# Patient Record
Sex: MALE | Race: WHITE | NOT HISPANIC OR LATINO | Employment: OTHER | URBAN - METROPOLITAN AREA
[De-identification: names, ages, dates, MRNs, and addresses within clinical notes are randomized per-mention and may not be internally consistent; named-entity substitution may affect disease eponyms.]

---

## 2017-01-03 RX ORDER — LOSARTAN POTASSIUM 25 MG/1
TABLET ORAL
Qty: 90 TABLET | OUTPATIENT
Start: 2017-01-03

## 2017-02-20 RX ORDER — TAMSULOSIN HYDROCHLORIDE 0.4 MG/1
CAPSULE ORAL
Qty: 90 CAPSULE | Refills: 3 | Status: SHIPPED | OUTPATIENT
Start: 2017-02-20 | End: 2018-02-15 | Stop reason: SDUPTHER

## 2017-03-31 DIAGNOSIS — I10 ESSENTIAL HYPERTENSION: ICD-10-CM

## 2017-03-31 DIAGNOSIS — N18.30 CHRONIC KIDNEY DISEASE (CKD), STAGE III (MODERATE) (HCC): ICD-10-CM

## 2017-03-31 DIAGNOSIS — Z00.00 HEALTH CARE MAINTENANCE: Primary | ICD-10-CM

## 2017-03-31 DIAGNOSIS — R73.03 BORDERLINE DIABETES: ICD-10-CM

## 2017-03-31 DIAGNOSIS — E78.2 MIXED HYPERLIPIDEMIA: ICD-10-CM

## 2017-03-31 DIAGNOSIS — E03.9 ACQUIRED HYPOTHYROIDISM: ICD-10-CM

## 2017-03-31 DIAGNOSIS — R73.09 ELEVATED HEMOGLOBIN A1C: ICD-10-CM

## 2017-04-06 LAB
ALBUMIN SERPL-MCNC: 4.3 G/DL (ref 3.5–5.2)
ALBUMIN/GLOB SERPL: 1.7 G/DL
ALP SERPL-CCNC: 57 U/L (ref 39–117)
ALT SERPL-CCNC: 32 U/L (ref 1–41)
APPEARANCE UR: CLEAR
AST SERPL-CCNC: 32 U/L (ref 1–40)
BACTERIA #/AREA URNS HPF: ABNORMAL /HPF
BASOPHILS # BLD AUTO: 0.02 10*3/MM3 (ref 0–0.2)
BASOPHILS NFR BLD AUTO: 0.2 % (ref 0–1.5)
BILIRUB SERPL-MCNC: 0.9 MG/DL (ref 0.1–1.2)
BILIRUB UR QL STRIP: NEGATIVE
BUN SERPL-MCNC: 14 MG/DL (ref 8–23)
BUN/CREAT SERPL: 9.5 (ref 7–25)
CALCIUM SERPL-MCNC: 10.5 MG/DL (ref 8.6–10.5)
CASTS URNS MICRO: ABNORMAL
CASTS URNS QL MICRO: PRESENT /LPF
CHLORIDE SERPL-SCNC: 104 MMOL/L (ref 98–107)
CHOLEST SERPL-MCNC: 128 MG/DL (ref 0–200)
CO2 SERPL-SCNC: 28.7 MMOL/L (ref 22–29)
COLOR UR: YELLOW
CREAT SERPL-MCNC: 1.47 MG/DL (ref 0.76–1.27)
CRYSTALS URNS MICRO: ABNORMAL
EOSINOPHIL # BLD AUTO: 0.59 10*3/MM3 (ref 0–0.7)
EOSINOPHIL NFR BLD AUTO: 6.7 % (ref 0.3–6.2)
EPI CELLS #/AREA URNS HPF: ABNORMAL /HPF
ERYTHROCYTE [DISTWIDTH] IN BLOOD BY AUTOMATED COUNT: 13.9 % (ref 11.5–14.5)
GLOBULIN SER CALC-MCNC: 2.5 GM/DL
GLUCOSE SERPL-MCNC: 121 MG/DL (ref 65–99)
GLUCOSE UR QL: NEGATIVE
HBA1C MFR BLD: 5.47 % (ref 4.8–5.6)
HCT VFR BLD AUTO: 45.5 % (ref 40.4–52.2)
HDLC SERPL-MCNC: 53 MG/DL (ref 40–60)
HGB BLD-MCNC: 15.3 G/DL (ref 13.7–17.6)
HGB UR QL STRIP: NEGATIVE
IMM GRANULOCYTES # BLD: 0.03 10*3/MM3 (ref 0–0.03)
IMM GRANULOCYTES NFR BLD: 0.3 % (ref 0–0.5)
KETONES UR QL STRIP: NEGATIVE
LDLC SERPL CALC-MCNC: 51 MG/DL (ref 0–100)
LEUKOCYTE ESTERASE UR QL STRIP: NEGATIVE
LYMPHOCYTES # BLD AUTO: 2.49 10*3/MM3 (ref 0.9–4.8)
LYMPHOCYTES NFR BLD AUTO: 28.2 % (ref 19.6–45.3)
MCH RBC QN AUTO: 31.8 PG (ref 27–32.7)
MCHC RBC AUTO-ENTMCNC: 33.6 G/DL (ref 32.6–36.4)
MCV RBC AUTO: 94.6 FL (ref 79.8–96.2)
MICRO URNS: NORMAL
MICRO URNS: NORMAL
MONOCYTES # BLD AUTO: 0.64 10*3/MM3 (ref 0.2–1.2)
MONOCYTES NFR BLD AUTO: 7.2 % (ref 5–12)
MUCOUS THREADS URNS QL MICRO: PRESENT /HPF
NEUTROPHILS # BLD AUTO: 5.06 10*3/MM3 (ref 1.9–8.1)
NEUTROPHILS NFR BLD AUTO: 57.4 % (ref 42.7–76)
NITRITE UR QL STRIP: NEGATIVE
PH UR STRIP: 5.5 [PH] (ref 5–7.5)
PLATELET # BLD AUTO: 143 10*3/MM3 (ref 140–500)
POTASSIUM SERPL-SCNC: 4.6 MMOL/L (ref 3.5–5.2)
PROT SERPL-MCNC: 6.8 G/DL (ref 6–8.5)
PROT UR QL STRIP: NORMAL
RBC # BLD AUTO: 4.81 10*6/MM3 (ref 4.6–6)
RBC #/AREA URNS HPF: ABNORMAL /HPF
SODIUM SERPL-SCNC: 145 MMOL/L (ref 136–145)
SP GR UR: 1.02 (ref 1–1.03)
T4 FREE SERPL-MCNC: 0.93 NG/DL (ref 0.93–1.7)
TRIGL SERPL-MCNC: 121 MG/DL (ref 0–150)
TSH SERPL DL<=0.005 MIU/L-ACNC: 4.54 MIU/ML (ref 0.27–4.2)
UNIDENT CRYS URNS QL MICRO: PRESENT /LPF
URINALYSIS REFLEX: NORMAL
UROBILINOGEN UR STRIP-MCNC: 0.2 MG/DL (ref 0.2–1)
VLDLC SERPL CALC-MCNC: 24.2 MG/DL (ref 5–40)
WBC # BLD AUTO: 8.83 10*3/MM3 (ref 4.5–10.7)
WBC #/AREA URNS HPF: ABNORMAL /HPF

## 2017-04-12 ENCOUNTER — OFFICE VISIT (OUTPATIENT)
Dept: INTERNAL MEDICINE | Facility: CLINIC | Age: 82
End: 2017-04-12

## 2017-04-12 VITALS
OXYGEN SATURATION: 98 % | HEART RATE: 63 BPM | HEIGHT: 69 IN | SYSTOLIC BLOOD PRESSURE: 122 MMHG | DIASTOLIC BLOOD PRESSURE: 72 MMHG | BODY MASS INDEX: 23.85 KG/M2 | RESPIRATION RATE: 18 BRPM | WEIGHT: 161 LBS

## 2017-04-12 DIAGNOSIS — N18.30 CHRONIC KIDNEY DISEASE (CKD), STAGE III (MODERATE) (HCC): ICD-10-CM

## 2017-04-12 DIAGNOSIS — E03.9 ACQUIRED HYPOTHYROIDISM: ICD-10-CM

## 2017-04-12 DIAGNOSIS — E78.2 MIXED HYPERLIPIDEMIA: ICD-10-CM

## 2017-04-12 DIAGNOSIS — Z00.00 HEALTH MAINTENANCE EXAMINATION: Primary | ICD-10-CM

## 2017-04-12 DIAGNOSIS — Z00.00 MEDICARE ANNUAL WELLNESS VISIT, SUBSEQUENT: ICD-10-CM

## 2017-04-12 PROCEDURE — G0439 PPPS, SUBSEQ VISIT: HCPCS | Performed by: INTERNAL MEDICINE

## 2017-04-12 PROCEDURE — 99397 PER PM REEVAL EST PAT 65+ YR: CPT | Performed by: INTERNAL MEDICINE

## 2017-04-12 RX ORDER — DOXYCYCLINE 50 MG/1
TABLET ORAL
Refills: 4 | COMMUNITY
Start: 2017-03-30 | End: 2019-09-18

## 2017-04-12 RX ORDER — ARIPIPRAZOLE 2 MG/1
4 TABLET ORAL DAILY
COMMUNITY

## 2017-04-12 NOTE — PROGRESS NOTES
Medicare Annual Wellness Visit    Chief Complaint:  Annual Exam (SUB AWV & CPE); Hypothyroidism; and Hyperlipidemia      History of Present Illness    Tyrone Kraft is a 83 y.o. male who presents for an Annual Wellness Visit & CPE. In addition, we addressed the following health issues:    C-scope: 2007.  He declines further scopes.    Flu shot:2016  Pneumovax: his wife says he has had this but is unsure of date.    Dental Exam:  Last month  Eye Exam: he has been to the eye doctor several times this year so far.    Exercise:  none  Advanced Care Planning:  has an advance directive - a copy HAS NOT been provided   Immunization History   Administered Date(s) Administered   • Pneumococcal Conjugate 13-Valent 10/12/2016   • Zoster 06/26/2012     He is here to follow up on his thyroid and HLD and CKD as well.  He does occasionally miss a dose of meds, so it's possible that his TSH is off as a result of this.  He is on a new med from his psychiatrist - abilify - which seems to have actually really helped.  He is less impulsive and is more alert.  No new concerns.  He denies any other symptoms that his thyroid could be off.  He is not as fatigued and he is not having any issues with his bowels.  His last c-scope was likely 2008 per his wife.  He does not wish to do this again.      Review of Systems   Constitutional: Negative for chills, fatigue and fever.   HENT: Negative for hearing loss, sore throat, tinnitus, trouble swallowing and voice change.    Eyes: Negative for visual disturbance.   Respiratory: Negative for cough and shortness of breath.    Cardiovascular: Negative for chest pain, palpitations and leg swelling.   Gastrointestinal: Negative for abdominal distention, abdominal pain, anal bleeding, blood in stool, constipation, diarrhea, nausea and vomiting.   Endocrine: Positive for polydipsia (due to dry mouth). Negative for polyuria.   Genitourinary: Negative for decreased urine volume, dysuria and hematuria.    Musculoskeletal: Positive for arthralgias (occ in his hip and knee). Negative for myalgias.   Skin: Negative for rash.   Neurological: Negative for dizziness, syncope, light-headedness and headaches.   Hematological: Negative for adenopathy. Does not bruise/bleed easily.   Psychiatric/Behavioral: Negative for confusion and dysphoric mood. The patient is not nervous/anxious.        Past Medical History:   Diagnosis Date   • BPPV (benign paroxysmal positional vertigo) 02/03/2015    I have put in a script for meclizine to take at night.  If he is not better in a week, will need to send to ENT for Eply maneuvers.   • Chronic kidney disease     Chronic. CR previously 1.6-2 but it was 1.4 in 11/13, f/b Dr. Goff   • Chronic kidney disease 02/03/2015    Stable. he sees Dr. Goff.  I will send his labs to [facility].   • Chronic kidney disease 08/13/2015    Stable.  Cont to monitor q4-6 mo.   • Coronary arteriosclerosis     MI in 1986 treated medically, had cath and then CABG in 1990 in [place], had stents in 2000 in [place].  Myoview 10/2014  MI but no ischemia.   • Dementia without behavioral disturbance 08/13/2015    Managed by Psychiatrist. I have encouraged his wife to be very vigilant with his driving and his reation time.  According to them, his psychiatrist is OK with him driving.   • Enlarged prostate without lower urinary tract symptoms (luts)    • Gastroparesis    • GERD (gastroesophageal reflux disease)    • Hypercalcemia 02/03/2015    Recheck labs in 3 months.   • Hyperlipidemia 08/13/2015    Very well controlled.  cont current meds.   • Hypertension 08/13/2015    Controlled. Cont current meds.   • Ischemic cardiomyopathy     Reported history, s/p singl lead Medtronic ICD 2006 in [place], no details available; repeat echo 2/2013 showed normal LV size and function, EF 55%   • MGUS (monoclonal gammopathy of unknown significance)    • Nonsustained ventricular tachycardia     Noted incidentally on ECD  interrogation   • KELLY (obstructive sleep apnea)        Past Surgical History:   Procedure Laterality Date   • ANGIOPLASTY      Cath Stent Placement   • CARDIAC DEFIBRILLATOR PLACEMENT      Medtronic singl lead, 2006, [facility]   • CORONARY ARTERY BYPASS GRAFT     • TOE SURGERY         Social History     Social History   • Marital status:      Spouse name: N/A   • Number of children: N/A   • Years of education: N/A     Occupational History   • Not on file.     Social History Main Topics   • Smoking status: Former Smoker   • Smokeless tobacco: Not on file      Comment: minimal years ago   • Alcohol use No   • Drug use: No   • Sexual activity: Not on file     Other Topics Concern   • Not on file     Social History Narrative       Family History   Problem Relation Age of Onset   • Thyroid disease Mother    • Lung disease Father    • Depression Brother    • Cancer Brother      LIver   • Myelodysplastic syndrome Brother    • Stroke Brother        No Known Allergies    Current Outpatient Prescriptions on File Prior to Visit   Medication Sig Dispense Refill   • acebutolol (SECTRAL) 200 MG capsule TAKE 1 CAPSULE DAILY 90 capsule 3   • aspirin 81 MG tablet Take by mouth.     • B Complex Vitamins (VITAMIN B COMPLEX) tablet Take 1 tablet by mouth daily.     • benzonatate (TESSALON PERLES) 100 MG capsule Take 1 capsule by mouth Every Night.  0   • esomeprazole (NexIUM) 40 MG capsule Take 1 capsule by mouth daily.     • Glucosamine 500 MG capsule Take by mouth.     • levothyroxine (LEVOTHROID) 25 MCG tablet Take 1 tablet by mouth Daily. 90 tablet 3   • Memantine HCl ER 21 MG capsule sustained-release 24 hr Take by mouth.     • Omega-3 Fatty Acids (FISH OIL) 1000 MG capsule capsule Take by mouth.     • polycarbophil (FIBERCON) 625 MG tablet Take by mouth.     • rivastigmine (EXELON) 6 MG capsule Take 1 capsule by mouth 2 (two) times a day.     • simvastatin (ZOCOR) 40 MG tablet TAKE 1 TABLET DAILY 90 tablet 3   •  "tamsulosin (FLOMAX) 0.4 MG capsule 24 hr capsule TAKE 1 CAPSULE DAILY 90 capsule 3   • traZODone (DESYREL) 50 MG tablet Take 1 tablet by mouth nightly.     • venlafaxine XR (EFFEXOR-XR) 37.5 MG 24 hr capsule Take by mouth.     • [DISCONTINUED] doxycycline (VIBRAMYCIN) 50 MG capsule Take 1 capsule by mouth daily.       No current facility-administered medications on file prior to visit.        Patient Active Problem List   Diagnosis   • Benign paroxysmal positional nystagmus   • Chronic kidney failure   • Chronic kidney disease (CKD), stage III (moderate)   • Enlarged prostate   • HLD (hyperlipidemia)   • MGUS (monoclonal gammopathy of unknown significance)   • Borderline diabetes   • Acquired hypothyroidism   • Elevated white blood cell count   • Perennial allergic rhinitis       Health Risk Assessment/Depression Screen/Functional and Cognitive Screening:   The patient has completed a Health Risk Assessment & Depression screen. These have been reviewed with them and have been scanned as a separate documents.    Age-appropriate Screening Schedule:  Refer to the list below for future screening recommendations based on patient's age. Orders for these recommended tests are listed in the plan section. The patient has been provided with a written plan.     I have reviewed their problem list, past medical history, family history, social history, and surgical history.     Vitals:    04/12/17 1253   BP: 122/72   Pulse: 63   Resp: 18   SpO2: 98%   Weight: 161 lb (73 kg)   Height: 69\" (175.3 cm)       Body mass index is 23.78 kg/(m^2).    Physical Exam   Constitutional: He is oriented to person, place, and time. He appears well-developed and well-nourished. No distress.   HENT:   Head: Normocephalic and atraumatic.   Right Ear: Hearing and external ear normal.   Left Ear: Hearing and external ear normal.   Nose: Nose normal.   Mouth/Throat: Oropharynx is clear and moist and mucous membranes are normal.   Eyes: Conjunctivae, " EOM and lids are normal. Pupils are equal, round, and reactive to light. No scleral icterus.   Neck: Trachea normal and normal range of motion. Neck supple.   Cardiovascular: Normal rate, regular rhythm and normal heart sounds.  Exam reveals no gallop and no friction rub.    No murmur heard.  Pulses:       Carotid pulses are 2+ on the right side, and 2+ on the left side.       Femoral pulses are 2+ on the right side, and 2+ on the left side.       Dorsalis pedis pulses are 2+ on the right side, and 2+ on the left side.        Posterior tibial pulses are 2+ on the right side, and 2+ on the left side.   Pulmonary/Chest: Effort normal and breath sounds normal. No respiratory distress. He has no wheezes. He has no rales.   Abdominal: Soft. Normal appearance and bowel sounds are normal. He exhibits no distension and no mass. There is no tenderness.   Musculoskeletal: Normal range of motion. He exhibits no edema.       Vascular Status -  His exam exhibits no right foot edema. His exam exhibits no left foot edema.   Skin Integrity  -  His right foot skin is intact.     Tyrone 's left foot skin is intact. .  Lymphadenopathy:     He has no cervical adenopathy.        Right: No inguinal and no supraclavicular adenopathy present.        Left: No inguinal and no supraclavicular adenopathy present.   Neurological: He is alert and oriented to person, place, and time. He has normal strength. No cranial nerve deficit. He exhibits normal muscle tone. Coordination and gait normal.   Skin: Skin is warm and dry. No rash noted.   Psychiatric: He has a normal mood and affect. His speech is normal and behavior is normal. Judgment and thought content normal. Cognition and memory are normal.   Vitals reviewed.      Results for orders placed or performed in visit on 03/31/17   Comprehensive Metabolic Panel   Result Value Ref Range    Glucose 121 (H) 65 - 99 mg/dL    BUN 14 8 - 23 mg/dL    Creatinine 1.47 (H) 0.76 - 1.27 mg/dL    eGFR Non   Am 46 (L) >60 mL/min/1.73    eGFR African Am 55 (L) >60 mL/min/1.73    BUN/Creatinine Ratio 9.5 7.0 - 25.0    Sodium 145 136 - 145 mmol/L    Potassium 4.6 3.5 - 5.2 mmol/L    Chloride 104 98 - 107 mmol/L    Total CO2 28.7 22.0 - 29.0 mmol/L    Calcium 10.5 8.6 - 10.5 mg/dL    Total Protein 6.8 6.0 - 8.5 g/dL    Albumin 4.30 3.50 - 5.20 g/dL    Globulin 2.5 gm/dL    A/G Ratio 1.7 g/dL    Total Bilirubin 0.9 0.1 - 1.2 mg/dL    Alkaline Phosphatase 57 39 - 117 U/L    AST (SGOT) 32 1 - 40 U/L    ALT (SGPT) 32 1 - 41 U/L   Lipid Panel   Result Value Ref Range    Total Cholesterol 128 0 - 200 mg/dL    Triglycerides 121 0 - 150 mg/dL    HDL Cholesterol 53 40 - 60 mg/dL    VLDL Cholesterol 24.2 5 - 40 mg/dL    LDL Cholesterol  51 0 - 100 mg/dL   TSH Rfx On Abnormal To Free T4   Result Value Ref Range    TSH 4.54 (H) 0.27 - 4.2 mIU/mL   UA / M With / Rflx Culture([facility] ONLY)   Result Value Ref Range    Specific Gravity, UA 1.025 1.005 - 1.030    pH, UA 5.5 5.0 - 7.5    Color, UA Yellow Yellow    Appearance, UA Clear Clear    Leukocytes, UA Negative Negative    Protein Trace Negative/Trace    Glucose, UA Negative Negative    Ketones Negative Negative    Blood, UA Negative Negative    Bilirubin, UA Negative Negative    Urobilinogen, UA 0.2 0.2 - 1.0 mg/dL    Nitrite, UA Negative Negative    Microscopic Examination Comment     MICROSCOPIC EXAMINATION See below:     Urinalysis Reflex Comment    Hemoglobin A1c   Result Value Ref Range    Hemoglobin A1C 5.47 4.80 - 5.60 %   Microscopic Examination   Result Value Ref Range    WBC, UA 0-5 0 - 5 /hpf    RBC, UA 0-2 0 - 2 /hpf    Epithelial Cells (non renal) 0-10 0 - 10 /hpf    Casts Present (A) None seen /lpf    Cast Type Hyaline casts N/A    Crystals, UA Present (A) N/A /lpf    Crystal Type Calcium Oxalate N/A    Mucus, UA Present Not Estab. /hpf    Bacteria, UA None seen None seen/Few /hpf   T4F   Result Value Ref Range    Free T4 0.93 0.93 - 1.70 ng/dL   CBC &  Differential   Result Value Ref Range    WBC 8.83 4.50 - 10.70 10*3/mm3    RBC 4.81 4.60 - 6.00 10*6/mm3    Hemoglobin 15.3 13.7 - 17.6 g/dL    Hematocrit 45.5 40.4 - 52.2 %    MCV 94.6 79.8 - 96.2 fL    MCH 31.8 27.0 - 32.7 pg    MCHC 33.6 32.6 - 36.4 g/dL    RDW 13.9 11.5 - 14.5 %    Platelets 143 140 - 500 10*3/mm3    Neutrophil Rel % 57.4 42.7 - 76.0 %    Lymphocyte Rel % 28.2 19.6 - 45.3 %    Monocyte Rel % 7.2 5.0 - 12.0 %    Eosinophil Rel % 6.7 (H) 0.3 - 6.2 %    Basophil Rel % 0.2 0.0 - 1.5 %    Neutrophils Absolute 5.06 1.90 - 8.10 10*3/mm3    Lymphocytes Absolute 2.49 0.90 - 4.80 10*3/mm3    Monocytes Absolute 0.64 0.20 - 1.20 10*3/mm3    Eosinophils Absolute 0.59 0.00 - 0.70 10*3/mm3    Basophils Absolute 0.02 0.00 - 0.20 10*3/mm3    Immature Granulocyte Rel % 0.3 0.0 - 0.5 %    Immature Grans Absolute 0.03 0.00 - 0.03 10*3/mm3       Assessment & Plan:    Diagnoses and all orders for this visit:    Health maintenance examination    Medicare annual wellness visit, subsequent    Chronic kidney disease (CKD), stage III (moderate)    Acquired hypothyroidism    Mixed hyperlipidemia    Other orders  -     doxycycline (ADOXA) 50 MG tablet; TK 1 T PO  BID  -     ARIPiprazole (ABILIFY) 5 MG tablet; Take 5 mg by mouth Daily.        Discussion/Summary  Will is here for his CPE/AWV.  He is up to date on health maintenance.  He is generally doing well.  His labs all look good.  His renal function is stable.  His TSH is mildly out of range but we are going to repeat this in a month.  If it is still abnormal then, we will increase his dose slightly.  Advised to continue all current meds.        Follow Up:  Return in about 6 months (around 10/12/2017) for Next scheduled follow up, with nonfasting labs prior; 1 month labs only.     An After Visit Summary and PPPS with all of these plans were given to the patient.

## 2017-04-12 NOTE — PATIENT INSTRUCTIONS
Medicare Wellness  Personal Prevention Plan of Service     Date of Office Visit:  2017  Encounter Provider:  Evelin Parrish MD  Place of Service:  [facility] INTER[facility] MED[facility]  Patient Name: Tyrone Kraft  :  1934    As part of the Medicare Wellness portion of your visit today, we are providing you with this personalized preventive plan of services (PPPS). This plan is based upon recommendations of the [place] Preventive Services Task Force (USPSTF) and the Advisory Committee on Immunization Practices (ACIP).    This lists the preventive care services that should be considered, and provides dates of when you are due. Items listed as completed are up-to-date and do not require any further intervention.    Health Maintenance   Topic Date Due   • TDAP/TD VACCINES (1 - Tdap) 1953   • PNEUMOCOCCAL VACCINES (65+ LOW/MEDIUM RISK) (2 of 2 - PPSV23) 10/12/2017   • LIPID PANEL  2018   • MEDICARE ANNUAL WELLNESS  2018   • INFLUENZA VACCINE  Completed   • ZOSTER VACCINE  Completed       No orders of the defined types were placed in this encounter.      No Follow-up on file.

## 2017-05-17 DIAGNOSIS — E03.9 ACQUIRED HYPOTHYROIDISM: ICD-10-CM

## 2017-09-25 DIAGNOSIS — E03.9 ACQUIRED HYPOTHYROIDISM: ICD-10-CM

## 2017-09-25 RX ORDER — LEVOTHYROXINE SODIUM 0.03 MG/1
TABLET ORAL
Qty: 90 TABLET | Refills: 3 | Status: SHIPPED | OUTPATIENT
Start: 2017-09-25 | End: 2017-10-17 | Stop reason: SDUPTHER

## 2017-10-12 LAB — TSH SERPL DL<=0.005 MIU/L-ACNC: 4.94 MIU/ML (ref 0.27–4.2)

## 2017-10-17 ENCOUNTER — OFFICE VISIT (OUTPATIENT)
Dept: INTERNAL MEDICINE | Facility: CLINIC | Age: 82
End: 2017-10-17

## 2017-10-17 VITALS
BODY MASS INDEX: 23.31 KG/M2 | HEIGHT: 69 IN | WEIGHT: 157.4 LBS | OXYGEN SATURATION: 96 % | SYSTOLIC BLOOD PRESSURE: 144 MMHG | HEART RATE: 68 BPM | RESPIRATION RATE: 18 BRPM | DIASTOLIC BLOOD PRESSURE: 76 MMHG

## 2017-10-17 DIAGNOSIS — E78.2 MIXED HYPERLIPIDEMIA: Primary | ICD-10-CM

## 2017-10-17 DIAGNOSIS — E03.9 ACQUIRED HYPOTHYROIDISM: ICD-10-CM

## 2017-10-17 DIAGNOSIS — Z23 NEED FOR INFLUENZA VACCINATION: ICD-10-CM

## 2017-10-17 DIAGNOSIS — R73.03 BORDERLINE DIABETES: ICD-10-CM

## 2017-10-17 DIAGNOSIS — N18.30 CHRONIC KIDNEY DISEASE (CKD), STAGE III (MODERATE) (HCC): ICD-10-CM

## 2017-10-17 LAB
Lab: NORMAL
Lab: NORMAL

## 2017-10-17 PROCEDURE — 90662 IIV NO PRSV INCREASED AG IM: CPT | Performed by: INTERNAL MEDICINE

## 2017-10-17 PROCEDURE — G0008 ADMIN INFLUENZA VIRUS VAC: HCPCS | Performed by: INTERNAL MEDICINE

## 2017-10-17 PROCEDURE — 99214 OFFICE O/P EST MOD 30 MIN: CPT | Performed by: INTERNAL MEDICINE

## 2017-10-17 RX ORDER — LEVOTHYROXINE SODIUM 0.05 MG/1
50 TABLET ORAL DAILY
Qty: 90 TABLET | Refills: 3 | Status: SHIPPED | OUTPATIENT
Start: 2017-10-17 | End: 2018-09-25 | Stop reason: SDUPTHER

## 2017-10-17 RX ORDER — MEMANTINE HYDROCHLORIDE 10 MG/1
TABLET ORAL
COMMUNITY
Start: 2017-09-08 | End: 2017-10-17
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
## 2017-10-18 ENCOUNTER — TELEPHONE (OUTPATIENT)
Dept: INTERNAL MEDICINE | Facility: CLINIC | Age: 82
End: 2017-10-18

## 2017-10-18 LAB
ALBUMIN SERPL-MCNC: 4.1 G/DL (ref 3.5–5.2)
ALBUMIN/GLOB SERPL: 1.5 G/DL
ALP SERPL-CCNC: 56 U/L (ref 39–117)
ALT SERPL-CCNC: 22 U/L (ref 1–41)
AST SERPL-CCNC: 25 U/L (ref 1–40)
BILIRUB SERPL-MCNC: 0.7 MG/DL (ref 0.1–1.2)
BUN SERPL-MCNC: 13 MG/DL (ref 8–23)
BUN/CREAT SERPL: 8.8 (ref 7–25)
CALCIUM SERPL-MCNC: 10.3 MG/DL (ref 8.6–10.5)
CHLORIDE SERPL-SCNC: 103 MMOL/L (ref 98–107)
CO2 SERPL-SCNC: 24.7 MMOL/L (ref 22–29)
CREAT SERPL-MCNC: 1.47 MG/DL (ref 0.76–1.27)
GLOBULIN SER CALC-MCNC: 2.7 GM/DL
GLUCOSE SERPL-MCNC: 132 MG/DL (ref 65–99)
POTASSIUM SERPL-SCNC: 4.6 MMOL/L (ref 3.5–5.2)
PROT SERPL-MCNC: 6.8 G/DL (ref 6–8.5)
SODIUM SERPL-SCNC: 146 MMOL/L (ref 136–145)
WRITTEN AUTHORIZATION: NORMAL

## 2017-10-18 NOTE — TELEPHONE ENCOUNTER
----- Message from Evelin Parrish MD sent at 10/18/2017  9:32 AM EDT -----  Please call - his liver function is normal.  Kidney function is stable.

## 2017-10-23 RX ORDER — ACEBUTOLOL HYDROCHLORIDE 200 MG/1
CAPSULE ORAL
Qty: 90 CAPSULE | Refills: 3 | Status: SHIPPED | OUTPATIENT
Start: 2017-10-23 | End: 2018-10-18 | Stop reason: SDUPTHER

## 2017-12-04 DIAGNOSIS — E78.2 MIXED HYPERLIPIDEMIA: ICD-10-CM

## 2017-12-04 DIAGNOSIS — E03.9 ACQUIRED HYPOTHYROIDISM: Primary | ICD-10-CM

## 2017-12-05 LAB
ALBUMIN SERPL-MCNC: 4.1 G/DL (ref 3.5–5.2)
ALBUMIN/GLOB SERPL: 1.6 G/DL
ALP SERPL-CCNC: 66 U/L (ref 39–117)
ALT SERPL-CCNC: 33 U/L (ref 1–41)
AST SERPL-CCNC: 30 U/L (ref 1–40)
BILIRUB SERPL-MCNC: 0.5 MG/DL (ref 0.1–1.2)
BUN SERPL-MCNC: 15 MG/DL (ref 8–23)
BUN/CREAT SERPL: 11 (ref 7–25)
CALCIUM SERPL-MCNC: 10.6 MG/DL (ref 8.6–10.5)
CHLORIDE SERPL-SCNC: 103 MMOL/L (ref 98–107)
CO2 SERPL-SCNC: 29.9 MMOL/L (ref 22–29)
CREAT SERPL-MCNC: 1.36 MG/DL (ref 0.76–1.27)
GFR SERPLBLD CREATININE-BSD FMLA CKD-EPI: 50 ML/MIN/1.73
GFR SERPLBLD CREATININE-BSD FMLA CKD-EPI: 61 ML/MIN/1.73
GLOBULIN SER CALC-MCNC: 2.6 GM/DL
GLUCOSE SERPL-MCNC: 114 MG/DL (ref 65–99)
POTASSIUM SERPL-SCNC: 4.9 MMOL/L (ref 3.5–5.2)
PROT SERPL-MCNC: 6.7 G/DL (ref 6–8.5)
SODIUM SERPL-SCNC: 144 MMOL/L (ref 136–145)
TSH SERPL DL<=0.005 MIU/L-ACNC: 3.33 MIU/ML (ref 0.27–4.2)

## 2017-12-06 ENCOUNTER — TELEPHONE (OUTPATIENT)
Dept: INTERNAL MEDICINE | Facility: CLINIC | Age: 82
End: 2017-12-06

## 2017-12-06 NOTE — TELEPHONE ENCOUNTER
----- Message from Evelin Parrish MD sent at 12/5/2017  4:46 PM EST -----  Please call - his thyroid looks better on the higher dose of levothyroxine.  Kidney function looks a little better.  Continue current meds.

## 2018-02-15 RX ORDER — TAMSULOSIN HYDROCHLORIDE 0.4 MG/1
CAPSULE ORAL
Qty: 90 CAPSULE | Refills: 3 | Status: SHIPPED | OUTPATIENT
Start: 2018-02-15 | End: 2019-02-11 | Stop reason: SDUPTHER

## 2018-04-12 DIAGNOSIS — E78.2 MIXED HYPERLIPIDEMIA: ICD-10-CM

## 2018-04-12 DIAGNOSIS — E03.9 ACQUIRED HYPOTHYROIDISM: ICD-10-CM

## 2018-04-12 DIAGNOSIS — Z00.00 HEALTH CARE MAINTENANCE: Primary | ICD-10-CM

## 2018-04-12 DIAGNOSIS — R73.03 BORDERLINE DIABETES: ICD-10-CM

## 2018-04-12 DIAGNOSIS — N18.30 CHRONIC KIDNEY DISEASE (CKD), STAGE III (MODERATE) (HCC): ICD-10-CM

## 2018-04-14 LAB
ALBUMIN SERPL-MCNC: 4.1 G/DL (ref 3.5–5.2)
ALBUMIN/GLOB SERPL: 1.6 G/DL
ALP SERPL-CCNC: 67 U/L (ref 39–117)
ALT SERPL-CCNC: 26 U/L (ref 1–41)
APPEARANCE UR: CLEAR
AST SERPL-CCNC: 28 U/L (ref 1–40)
BACTERIA #/AREA URNS HPF: ABNORMAL /HPF
BASOPHILS # BLD AUTO: 0.02 10*3/MM3 (ref 0–0.2)
BASOPHILS NFR BLD AUTO: 0.2 % (ref 0–1.5)
BILIRUB SERPL-MCNC: 0.7 MG/DL (ref 0.1–1.2)
BILIRUB UR QL STRIP: NEGATIVE
BUN SERPL-MCNC: 14 MG/DL (ref 8–23)
BUN/CREAT SERPL: 10.1 (ref 7–25)
CALCIUM SERPL-MCNC: 10.2 MG/DL (ref 8.6–10.5)
CASTS URNS MICRO: ABNORMAL
CASTS URNS QL MICRO: PRESENT /LPF
CHLORIDE SERPL-SCNC: 101 MMOL/L (ref 98–107)
CHOLEST SERPL-MCNC: 184 MG/DL (ref 0–200)
CO2 SERPL-SCNC: 29.3 MMOL/L (ref 22–29)
COLOR UR: YELLOW
CREAT SERPL-MCNC: 1.39 MG/DL (ref 0.76–1.27)
CRYSTALS URNS MICRO: ABNORMAL
EOSINOPHIL # BLD AUTO: 0.62 10*3/MM3 (ref 0–0.7)
EOSINOPHIL NFR BLD AUTO: 6.4 % (ref 0.3–6.2)
EPI CELLS #/AREA URNS HPF: ABNORMAL /HPF
ERYTHROCYTE [DISTWIDTH] IN BLOOD BY AUTOMATED COUNT: 13.7 % (ref 11.5–14.5)
GFR SERPLBLD CREATININE-BSD FMLA CKD-EPI: 49 ML/MIN/1.73
GFR SERPLBLD CREATININE-BSD FMLA CKD-EPI: 59 ML/MIN/1.73
GLOBULIN SER CALC-MCNC: 2.6 GM/DL
GLUCOSE SERPL-MCNC: 102 MG/DL (ref 65–99)
GLUCOSE UR QL: NEGATIVE
HBA1C MFR BLD: 5.5 % (ref 4.8–5.6)
HCT VFR BLD AUTO: 46.5 % (ref 40.4–52.2)
HDLC SERPL-MCNC: 42 MG/DL (ref 40–60)
HGB BLD-MCNC: 15.2 G/DL (ref 13.7–17.6)
HGB UR QL STRIP: NEGATIVE
IMM GRANULOCYTES # BLD: 0.03 10*3/MM3 (ref 0–0.03)
IMM GRANULOCYTES NFR BLD: 0.3 % (ref 0–0.5)
KETONES UR QL STRIP: NEGATIVE
LDLC SERPL CALC-MCNC: 107 MG/DL (ref 0–100)
LEUKOCYTE ESTERASE UR QL STRIP: NEGATIVE
LYMPHOCYTES # BLD AUTO: 2.98 10*3/MM3 (ref 0.9–4.8)
LYMPHOCYTES NFR BLD AUTO: 30.8 % (ref 19.6–45.3)
MCH RBC QN AUTO: 32.1 PG (ref 27–32.7)
MCHC RBC AUTO-ENTMCNC: 32.7 G/DL (ref 32.6–36.4)
MCV RBC AUTO: 98.1 FL (ref 79.8–96.2)
MICRO URNS: NORMAL
MICRO URNS: NORMAL
MONOCYTES # BLD AUTO: 0.71 10*3/MM3 (ref 0.2–1.2)
MONOCYTES NFR BLD AUTO: 7.3 % (ref 5–12)
MUCOUS THREADS URNS QL MICRO: PRESENT /HPF
NEUTROPHILS # BLD AUTO: 5.33 10*3/MM3 (ref 1.9–8.1)
NEUTROPHILS NFR BLD AUTO: 55 % (ref 42.7–76)
NITRITE UR QL STRIP: NEGATIVE
PH UR STRIP: 5.5 [PH] (ref 5–7.5)
PLATELET # BLD AUTO: 162 10*3/MM3 (ref 140–500)
POTASSIUM SERPL-SCNC: 4.4 MMOL/L (ref 3.5–5.2)
PROT SERPL-MCNC: 6.7 G/DL (ref 6–8.5)
PROT UR QL STRIP: NEGATIVE
RBC # BLD AUTO: 4.74 10*6/MM3 (ref 4.6–6)
RBC #/AREA URNS HPF: ABNORMAL /HPF
SODIUM SERPL-SCNC: 143 MMOL/L (ref 136–145)
SP GR UR: 1.02 (ref 1–1.03)
TRIGL SERPL-MCNC: 173 MG/DL (ref 0–150)
TSH SERPL DL<=0.005 MIU/L-ACNC: 4.06 MIU/ML (ref 0.27–4.2)
UNIDENT CRYS URNS QL MICRO: PRESENT /LPF
URINALYSIS REFLEX: NORMAL
UROBILINOGEN UR STRIP-MCNC: 0.2 MG/DL (ref 0.2–1)
VLDLC SERPL CALC-MCNC: 34.6 MG/DL (ref 5–40)
WBC # BLD AUTO: 9.69 10*3/MM3 (ref 4.5–10.7)
WBC #/AREA URNS HPF: ABNORMAL /HPF

## 2018-06-05 ENCOUNTER — OFFICE VISIT (OUTPATIENT)
Dept: INTERNAL MEDICINE | Facility: CLINIC | Age: 83
End: 2018-06-05

## 2018-06-05 VITALS
DIASTOLIC BLOOD PRESSURE: 74 MMHG | BODY MASS INDEX: 21.92 KG/M2 | WEIGHT: 148 LBS | SYSTOLIC BLOOD PRESSURE: 120 MMHG | HEIGHT: 69 IN | HEART RATE: 74 BPM | OXYGEN SATURATION: 95 %

## 2018-06-05 DIAGNOSIS — Z00.00 HEALTH MAINTENANCE EXAMINATION: Primary | ICD-10-CM

## 2018-06-05 DIAGNOSIS — E03.9 ACQUIRED HYPOTHYROIDISM: ICD-10-CM

## 2018-06-05 DIAGNOSIS — Z00.00 MEDICARE ANNUAL WELLNESS VISIT, SUBSEQUENT: ICD-10-CM

## 2018-06-05 DIAGNOSIS — E78.2 MIXED HYPERLIPIDEMIA: ICD-10-CM

## 2018-06-05 DIAGNOSIS — N18.30 CHRONIC KIDNEY DISEASE (CKD), STAGE III (MODERATE) (HCC): ICD-10-CM

## 2018-06-05 DIAGNOSIS — R73.03 BORDERLINE DIABETES: ICD-10-CM

## 2018-06-05 DIAGNOSIS — F03.90 DEMENTIA WITHOUT BEHAVIORAL DISTURBANCE, UNSPECIFIED DEMENTIA TYPE: ICD-10-CM

## 2018-06-05 PROCEDURE — 99397 PER PM REEVAL EST PAT 65+ YR: CPT | Performed by: INTERNAL MEDICINE

## 2018-06-05 PROCEDURE — G0439 PPPS, SUBSEQ VISIT: HCPCS | Performed by: INTERNAL MEDICINE

## 2018-06-05 NOTE — PROGRESS NOTES
Medicare Annual Wellness Visit    Chief Complaint:  Annual Exam (SUB AWV & CPE)      History of Present Illness    Tyrone Kraft is a 84 y.o. male who presents for an Annual Wellness Visit & CPE. In addition, we addressed the following health issues:    C-scope: 2007 declines further    Dental Exam: just there.  Eye Exam: this year - goes twice a year  Exercise:  Walking occasionally with wife.    Advanced Care Planning:  has an advance directive - a copy has been provided and is in file   Immunization History   Administered Date(s) Administered   • Flu Vaccine High Dose PF 65YR+ 10/17/2017   • Pneumococcal Conjugate 13-Valent (PCV13) 10/12/2016   • Pneumococcal Polysaccharide (PPSV23) 01/01/2010   • Tdap 10/17/2017   • Zostavax 06/26/2012     Will is also here for f/u on his HLD, CKD, PreDM, & hypothyroid.  He has lost a lot of weight.  He hasn't been eating as much.  Nothing really tastes good.  He doesn't do much during the day.  He sleeps till about noon.  Takes naps.  Watches game shows.  He takes walks occasionally with his wife.  She realizes he has deteriorated quite a bit in the last six months.      Review of Systems   Constitution: Positive for malaise/fatigue. Negative for chills and fever.   HENT: Negative for hearing loss, hoarse voice and sore throat.    Eyes: Negative for blurred vision, double vision and visual disturbance.   Cardiovascular: Negative for chest pain, leg swelling and palpitations.   Respiratory: Negative for cough and shortness of breath.    Endocrine: Negative for polydipsia and polyuria.   Hematologic/Lymphatic: Does not bruise/bleed easily.   Skin: Negative for rash and suspicious lesions.   Musculoskeletal: Positive for falls. Negative for arthritis and myalgias.   Gastrointestinal: Negative for bloating, abdominal pain, change in bowel habit, constipation, diarrhea, dysphagia, hematochezia, melena, nausea and vomiting.   Genitourinary: Negative for dysuria and hematuria.    Neurological: Positive for loss of balance. Negative for dizziness, headaches and light-headedness.   Psychiatric/Behavioral: Positive for memory loss. Negative for depression. The patient is not nervous/anxious.        Past Medical History:   Diagnosis Date   • BPPV (benign paroxysmal positional vertigo) 02/03/2015    I have put in a script for meclizine to take at night.  If he is not better in a week, will need to send to ENT for Eply maneuvers.   • Chronic kidney disease     Chronic. CR previously 1.6-2 but it was 1.4 in 11/13, f/b Dr. Goff   • Chronic kidney disease 02/03/2015    Stable. he sees Dr. Goff.  I will send his labs to [place].   • Chronic kidney disease 08/13/2015    Stable.  Cont to monitor q4-6 mo.   • Coronary arteriosclerosis     MI in 1986 treated medically, had cath and then CABG in 1990 in [place], had stents in 2000 in [place].  Myoview 10/2014  MI but no ischemia.   • Dementia without behavioral disturbance 08/13/2015    Managed by Psychiatrist. I have encouraged his wife to be very vigilant with his driving and his reation time.  According to them, his psychiatrist is OK with him driving.   • Enlarged prostate without lower urinary tract symptoms (luts)    • Gastroparesis    • GERD (gastroesophageal reflux disease)    • Hypercalcemia 02/03/2015    Recheck labs in 3 months.   • Hyperlipidemia 08/13/2015    Very well controlled.  cont current meds.   • Hypertension 08/13/2015    Controlled. Cont current meds.   • Ischemic cardiomyopathy     Reported history, s/p singl lead Medtronic ICD 2006 in [place], no details available; repeat echo 2/2013 showed normal LV size and function, EF 55%   • MGUS (monoclonal gammopathy of unknown significance)    • Nonsustained ventricular tachycardia     Noted incidentally on ECD interrogation   • KELLY (obstructive sleep apnea)        Past Surgical History:   Procedure Laterality Date   • ANGIOPLASTY      Cath Stent Placement   • CARDIAC DEFIBRILLATOR  PLACEMENT      Medtronic singl lead, 2006, [facility]   • CORONARY ARTERY BYPASS GRAFT     • TOE SURGERY         Social History     Social History   • Marital status:      Spouse name: N/A   • Number of children: N/A   • Years of education: N/A     Occupational History   • Not on file.     Social History Main Topics   • Smoking status: Former Smoker   • Smokeless tobacco: Never Used      Comment: minimal years ago   • Alcohol use No   • Drug use: No   • Sexual activity: Not on file     Other Topics Concern   • Not on file     Social History Narrative   • No narrative on file       Family History   Problem Relation Age of Onset   • Thyroid disease Mother    • Lung disease Father    • Depression Brother    • Cancer Brother         LIver   • Myelodysplastic syndrome Brother    • Stroke Brother        No Known Allergies    Current Outpatient Prescriptions on File Prior to Visit   Medication Sig Dispense Refill   • acebutolol (SECTRAL) 200 MG capsule TAKE 1 CAPSULE DAILY 90 capsule 3   • ARIPiprazole (ABILIFY) 5 MG tablet Take 5 mg by mouth Daily. Take 1/2 tablet daily     • aspirin 81 MG tablet Take by mouth.     • B Complex Vitamins (VITAMIN B COMPLEX) tablet Take 1 tablet by mouth daily.     • benzonatate (TESSALON PERLES) 100 MG capsule Take 1 capsule by mouth Every Night.  0   • doxycycline (ADOXA) 50 MG tablet TK 1 T PO  BID  4   • esomeprazole (NexIUM) 40 MG capsule Take 1 capsule by mouth daily.     • Glucosamine 500 MG capsule Take by mouth.     • levothyroxine (SYNTHROID, LEVOTHROID) 50 MCG tablet Take 1 tablet by mouth Daily. 90 tablet 3   • Memantine HCl ER 21 MG capsule sustained-release 24 hr Take by mouth.     • Omega-3 Fatty Acids (FISH OIL) 1000 MG capsule capsule Take by mouth.     • polycarbophil (FIBERCON) 625 MG tablet Take by mouth.     • rivastigmine (EXELON) 6 MG capsule Take 1 capsule by mouth 2 (two) times a day.     • tamsulosin (FLOMAX) 0.4 MG capsule 24 hr capsule TAKE 1 CAPSULE DAILY  "90 capsule 3   • traZODone (DESYREL) 50 MG tablet Take 1 tablet by mouth nightly.     • venlafaxine XR (EFFEXOR-XR) 37.5 MG 24 hr capsule Take by mouth.       No current facility-administered medications on file prior to visit.        Patient Active Problem List   Diagnosis   • Benign paroxysmal positional nystagmus   • Chronic kidney failure   • Chronic kidney disease (CKD), stage III (moderate)   • Enlarged prostate   • HLD (hyperlipidemia)   • MGUS (monoclonal gammopathy of unknown significance)   • Borderline diabetes   • Acquired hypothyroidism   • Elevated white blood cell count   • Perennial allergic rhinitis   • Dementia without behavioral disturbance       Health Risk Assessment/Depression Screen/Functional and Cognitive Screening:   The patient has completed a Health Risk Assessment & Depression screen. These have been reviewed with them and have been scanned as a separate documents.    Age-appropriate Screening Schedule:  Refer to the list below for future screening recommendations based on patient's age. Orders for these recommended tests are listed in the plan section. The patient has been provided with a written plan.     I have reviewed their problem list, past medical history, family history, social history, and surgical history.     Vitals:    06/05/18 1413   BP: 120/74   Pulse: 74   SpO2: 95%   Weight: 67.1 kg (148 lb)   Height: 175.3 cm (69\")   PainSc: 0-No pain       Body mass index is 21.86 kg/m².    Physical Exam   Constitutional: He is oriented to person, place, and time. He appears well-developed and well-nourished. No distress.   HENT:   Head: Normocephalic and atraumatic.   Right Ear: Hearing and external ear normal.   Left Ear: Hearing and external ear normal.   Nose: Nose normal.   Mouth/Throat: Oropharynx is clear and moist and mucous membranes are normal.   Eyes: Conjunctivae, EOM and lids are normal. Pupils are equal, round, and reactive to light. No scleral icterus.   Neck: Trachea " normal and normal range of motion. Neck supple.   Cardiovascular: Normal rate, regular rhythm and normal heart sounds.  Exam reveals no gallop and no friction rub.    No murmur heard.  Pulses:       Carotid pulses are 2+ on the right side, and 2+ on the left side.       Femoral pulses are 2+ on the right side, and 2+ on the left side.       Dorsalis pedis pulses are 2+ on the right side, and 2+ on the left side.        Posterior tibial pulses are 2+ on the right side, and 2+ on the left side.   Pulmonary/Chest: Effort normal and breath sounds normal. No respiratory distress. He has no wheezes. He has no rales.   Abdominal: Soft. Normal appearance and bowel sounds are normal. He exhibits no distension and no mass. There is no tenderness.   Musculoskeletal: Normal range of motion. He exhibits no edema.     Vascular Status -  His right foot exhibits no edema. His left foot exhibits no edema.  Skin Integrity  -  His right foot skin is intact.His left foot skin is intact..  Lymphadenopathy:     He has no cervical adenopathy.        Right: No inguinal and no supraclavicular adenopathy present.        Left: No inguinal and no supraclavicular adenopathy present.   Neurological: He is alert and oriented to person, place, and time. He has normal strength. No cranial nerve deficit. He exhibits normal muscle tone. Coordination and gait normal.   Skin: Skin is warm and dry. No rash noted.   Psychiatric: He has a normal mood and affect. His speech is normal and behavior is normal. Judgment and thought content normal. Cognition and memory are normal.   Vitals reviewed.      Results for orders placed or performed in visit on 04/12/18   Comprehensive Metabolic Panel   Result Value Ref Range    Glucose 102 (H) 65 - 99 mg/dL    BUN 14 8 - 23 mg/dL    Creatinine 1.39 (H) 0.76 - 1.27 mg/dL    eGFR Non African Am 49 (L) >60 mL/min/1.73    eGFR African Am 59 (L) >60 mL/min/1.73    BUN/Creatinine Ratio 10.1 7.0 - 25.0    Sodium 143 136 -  145 mmol/L    Potassium 4.4 3.5 - 5.2 mmol/L    Chloride 101 98 - 107 mmol/L    Total CO2 29.3 (H) 22.0 - 29.0 mmol/L    Calcium 10.2 8.6 - 10.5 mg/dL    Total Protein 6.7 6.0 - 8.5 g/dL    Albumin 4.10 3.50 - 5.20 g/dL    Globulin 2.6 gm/dL    A/G Ratio 1.6 g/dL    Total Bilirubin 0.7 0.1 - 1.2 mg/dL    Alkaline Phosphatase 67 39 - 117 U/L    AST (SGOT) 28 1 - 40 U/L    ALT (SGPT) 26 1 - 41 U/L   Lipid Panel   Result Value Ref Range    Total Cholesterol 184 0 - 200 mg/dL    Triglycerides 173 (H) 0 - 150 mg/dL    HDL Cholesterol 42 40 - 60 mg/dL    VLDL Cholesterol 34.6 5 - 40 mg/dL    LDL Cholesterol  107 (H) 0 - 100 mg/dL   TSH Rfx On Abnormal To Free T4   Result Value Ref Range    TSH 4.06 0.27 - 4.2 mIU/mL   Urinalysis With / Culture If Indicated - Urine, Clean Catch   Result Value Ref Range    Specific Gravity, UA 1.019 1.005 - 1.030    pH, UA 5.5 5.0 - 7.5    Color, UA Yellow Yellow    Appearance, UA Clear Clear    Leukocytes, UA Negative Negative    Protein Negative Negative/Trace    Glucose, UA Negative Negative    Ketones Negative Negative    Blood, UA Negative Negative    Bilirubin, UA Negative Negative    Urobilinogen, UA 0.2 0.2 - 1.0 mg/dL    Nitrite, UA Negative Negative    Microscopic Examination Comment     MICROSCOPIC EXAMINATION See below:     Urinalysis Reflex Comment    Hemoglobin A1c   Result Value Ref Range    Hemoglobin A1C 5.50 4.80 - 5.60 %   Microscopic Examination   Result Value Ref Range    WBC, UA 0-5 0 - 5 /hpf    RBC, UA 0-2 0 - 2 /hpf    Epithelial Cells (non renal) 0-10 0 - 10 /hpf    Casts Present (A) None seen /lpf    Cast Type Hyaline casts N/A    Crystals, UA Present (A) N/A /lpf    Crystal Type Calcium Oxalate N/A    Mucus, UA Present Not Estab. /hpf    Bacteria, UA None seen None seen/Few /hpf   CBC & Differential   Result Value Ref Range    WBC 9.69 4.50 - 10.70 10*3/mm3    RBC 4.74 4.60 - 6.00 10*6/mm3    Hemoglobin 15.2 13.7 - 17.6 g/dL    Hematocrit 46.5 40.4 - 52.2 %     MCV 98.1 (H) 79.8 - 96.2 fL    MCH 32.1 27.0 - 32.7 pg    MCHC 32.7 32.6 - 36.4 g/dL    RDW 13.7 11.5 - 14.5 %    Platelets 162 140 - 500 10*3/mm3    Neutrophil Rel % 55.0 42.7 - 76.0 %    Lymphocyte Rel % 30.8 19.6 - 45.3 %    Monocyte Rel % 7.3 5.0 - 12.0 %    Eosinophil Rel % 6.4 (H) 0.3 - 6.2 %    Basophil Rel % 0.2 0.0 - 1.5 %    Neutrophils Absolute 5.33 1.90 - 8.10 10*3/mm3    Lymphocytes Absolute 2.98 0.90 - 4.80 10*3/mm3    Monocytes Absolute 0.71 0.20 - 1.20 10*3/mm3    Eosinophils Absolute 0.62 0.00 - 0.70 10*3/mm3    Basophils Absolute 0.02 0.00 - 0.20 10*3/mm3    Immature Granulocyte Rel % 0.3 0.0 - 0.5 %    Immature Grans Absolute 0.03 0.00 - 0.03 10*3/mm3       Assessment & Plan:    Diagnoses and all orders for this visit:    Health maintenance examination    Medicare annual wellness visit, subsequent    Chronic kidney disease (CKD), stage III (moderate)    Mixed hyperlipidemia    Acquired hypothyroidism    Borderline diabetes    Dementia without behavioral disturbance, unspecified dementia type        Discussion/Summary  Will is here for his CPE and AWV.  He is up to date on health maintenance.  His creatinine is stable.  Lipids all look good.  His thyroid is well controlled.  His A1c is normal now.  Encouraged to try to stay active and keep his strength up.  They have elected not to have his defibrillator battery replaced.  His wife is supplementing his diet with ensure or boost to keep his energy up.  I will see him back in 6 mo.  In the interim, he will follow up with neuro and psych.  I mentioned they may want to discuss possibly decreasing some of the med doses to see if this would improve his overall sleepiness.        Follow Up:  Return in about 6 months (around 12/5/2018) for Next scheduled follow up, with nonfasting labs prior.     An After Visit Summary and PPPS with all of these plans were given to the patient.

## 2018-09-25 DIAGNOSIS — E03.9 ACQUIRED HYPOTHYROIDISM: ICD-10-CM

## 2018-09-25 RX ORDER — LEVOTHYROXINE SODIUM 0.05 MG/1
TABLET ORAL
Qty: 90 TABLET | Refills: 3 | Status: SHIPPED | OUTPATIENT
Start: 2018-09-25 | End: 2020-07-06

## 2018-10-18 RX ORDER — ACEBUTOLOL HYDROCHLORIDE 200 MG/1
CAPSULE ORAL
Qty: 90 CAPSULE | Refills: 1 | Status: SHIPPED | OUTPATIENT
Start: 2018-10-18 | End: 2019-04-16 | Stop reason: SDUPTHER

## 2018-12-03 DIAGNOSIS — E03.9 ACQUIRED HYPOTHYROIDISM: ICD-10-CM

## 2018-12-03 DIAGNOSIS — N18.30 CHRONIC KIDNEY DISEASE (CKD), STAGE III (MODERATE) (HCC): ICD-10-CM

## 2018-12-03 DIAGNOSIS — E78.2 MIXED HYPERLIPIDEMIA: Primary | ICD-10-CM

## 2018-12-03 DIAGNOSIS — R73.03 BORDERLINE DIABETES: ICD-10-CM

## 2018-12-04 LAB
ALBUMIN SERPL-MCNC: 4.3 G/DL (ref 3.5–5.2)
ALBUMIN/GLOB SERPL: 1.7 G/DL
ALP SERPL-CCNC: 71 U/L (ref 39–117)
ALT SERPL-CCNC: 30 U/L (ref 1–41)
AST SERPL-CCNC: 30 U/L (ref 1–40)
BASOPHILS # BLD AUTO: 0.02 10*3/MM3 (ref 0–0.2)
BASOPHILS NFR BLD AUTO: 0.2 % (ref 0–1.5)
BILIRUB SERPL-MCNC: 0.8 MG/DL (ref 0.1–1.2)
BUN SERPL-MCNC: 15 MG/DL (ref 8–23)
BUN/CREAT SERPL: 9.7 (ref 7–25)
CALCIUM SERPL-MCNC: 10.5 MG/DL (ref 8.6–10.5)
CHLORIDE SERPL-SCNC: 103 MMOL/L (ref 98–107)
CO2 SERPL-SCNC: 28.8 MMOL/L (ref 22–29)
CREAT SERPL-MCNC: 1.54 MG/DL (ref 0.76–1.27)
EOSINOPHIL # BLD AUTO: 0.5 10*3/MM3 (ref 0–0.7)
EOSINOPHIL NFR BLD AUTO: 4.9 % (ref 0.3–6.2)
ERYTHROCYTE [DISTWIDTH] IN BLOOD BY AUTOMATED COUNT: 13.6 % (ref 11.5–14.5)
GLOBULIN SER CALC-MCNC: 2.6 GM/DL
GLUCOSE SERPL-MCNC: 151 MG/DL (ref 65–99)
HBA1C MFR BLD: 5 % (ref 4.8–5.6)
HCT VFR BLD AUTO: 44.3 % (ref 40.4–52.2)
HGB BLD-MCNC: 15.3 G/DL (ref 13.7–17.6)
IMM GRANULOCYTES # BLD: 0.03 10*3/MM3 (ref 0–0.03)
IMM GRANULOCYTES NFR BLD: 0.3 % (ref 0–0.5)
LYMPHOCYTES # BLD AUTO: 2.17 10*3/MM3 (ref 0.9–4.8)
LYMPHOCYTES NFR BLD AUTO: 21.1 % (ref 19.6–45.3)
MCH RBC QN AUTO: 31.9 PG (ref 27–32.7)
MCHC RBC AUTO-ENTMCNC: 34.5 G/DL (ref 32.6–36.4)
MCV RBC AUTO: 92.3 FL (ref 79.8–96.2)
MONOCYTES # BLD AUTO: 0.73 10*3/MM3 (ref 0.2–1.2)
MONOCYTES NFR BLD AUTO: 7.1 % (ref 5–12)
NEUTROPHILS # BLD AUTO: 6.85 10*3/MM3 (ref 1.9–8.1)
NEUTROPHILS NFR BLD AUTO: 66.7 % (ref 42.7–76)
PLATELET # BLD AUTO: 151 10*3/MM3 (ref 140–500)
POTASSIUM SERPL-SCNC: 4.4 MMOL/L (ref 3.5–5.2)
PROT SERPL-MCNC: 6.9 G/DL (ref 6–8.5)
RBC # BLD AUTO: 4.8 10*6/MM3 (ref 4.6–6)
SODIUM SERPL-SCNC: 143 MMOL/L (ref 136–145)
T4 FREE SERPL-MCNC: 1.13 NG/DL (ref 0.93–1.7)
TSH SERPL DL<=0.005 MIU/L-ACNC: 4.96 MIU/ML (ref 0.27–4.2)
WBC # BLD AUTO: 10.27 10*3/MM3 (ref 4.5–10.7)

## 2018-12-09 ENCOUNTER — HOSPITAL ENCOUNTER (EMERGENCY)
Facility: HOSPITAL | Age: 83
Discharge: HOME | End: 2018-12-09
Attending: EMERGENCY MEDICINE | Admitting: EMERGENCY MEDICINE

## 2018-12-09 ENCOUNTER — APPOINTMENT (OUTPATIENT)
Dept: CT IMAGING | Facility: HOSPITAL | Age: 83
End: 2018-12-09

## 2018-12-09 VITALS
RESPIRATION RATE: 16 BRPM | WEIGHT: 165 LBS | HEIGHT: 69 IN | DIASTOLIC BLOOD PRESSURE: 79 MMHG | BODY MASS INDEX: 24.44 KG/M2 | TEMPERATURE: 97.7 F | SYSTOLIC BLOOD PRESSURE: 149 MMHG | OXYGEN SATURATION: 99 % | HEART RATE: 70 BPM

## 2018-12-09 DIAGNOSIS — R51.9 ACUTE NONINTRACTABLE HEADACHE, UNSPECIFIED HEADACHE TYPE: Primary | ICD-10-CM

## 2018-12-09 PROCEDURE — 70450 CT HEAD/BRAIN W/O DYE: CPT

## 2018-12-09 PROCEDURE — 99283 EMERGENCY DEPT VISIT LOW MDM: CPT

## 2018-12-09 RX ORDER — LORATADINE 10 MG/1
1 CAPSULE, LIQUID FILLED ORAL DAILY
COMMUNITY

## 2018-12-09 RX ORDER — CYCLOSPORINE 0.5 MG/ML
1 EMULSION OPHTHALMIC 2 TIMES DAILY
COMMUNITY

## 2018-12-09 RX ORDER — CHOLECALCIFEROL (VITAMIN D3) 125 MCG
10 CAPSULE ORAL NIGHTLY
COMMUNITY

## 2018-12-09 NOTE — ED PROVIDER NOTES
EMERGENCY DEPARTMENT ENCOUNTER    CHIEF COMPLAINT  Chief Complaint: Neck Pain   History given by: Patient and wife   History limited by: dementia   Room Number: 05/05  PMD: Evelin Parrish MD      HPI:  Pt is a 84 y.o. male who presents due to sudden onset, intermittent episodes of neck pain that began 3 hours ago while getting out of bed. Per wife, pt has no recent injury or fall, states pt awoke complaining of neck pain, which pt now states is HA. Pt denies numbness or weakness. Pt's wife states pt has hx of Alzheimer's disease and is at neuro baseline.     Duration:  3 hours   Onset: sudden  Timing: intermittent   Location: neck   Radiation: none   Quality: pain   Intensity/Severity: mild   Progression: unchanged   Associated Symptoms: HA  Aggravating Factors: unknown   Alleviating Factors: none   Previous Episodes: none   Treatment before arrival: none    PAST MEDICAL HISTORY  Active Ambulatory Problems     Diagnosis Date Noted   • Benign paroxysmal positional nystagmus 01/20/2016   • Chronic kidney failure 01/20/2016   • Chronic kidney disease (CKD), stage III (moderate) (CMS/HCC) 01/20/2016   • Enlarged prostate 01/20/2016   • HLD (hyperlipidemia) 01/20/2016   • MGUS (monoclonal gammopathy of unknown significance) 01/20/2016   • Borderline diabetes 01/20/2016   • Acquired hypothyroidism 02/16/2016   • Elevated white blood cell count 02/16/2016   • Perennial allergic rhinitis 12/06/2016   • Dementia without behavioral disturbance 06/05/2018     Resolved Ambulatory Problems     Diagnosis Date Noted   • Abnormal thyroid stimulating hormone level 01/20/2016   • Chest discomfort 01/20/2016   • BP (high blood pressure) 01/20/2016   • Cervical pain 01/20/2016   • Back ache 01/20/2016   • Pain in shoulder 01/20/2016   • Injury of left shoulder 01/21/2016   • Tendinopathy of left rotator cuff 01/21/2016   • Elevated liver enzymes 02/16/2016     Past Medical History:   Diagnosis Date   • BPPV (benign paroxysmal  positional vertigo) 02/03/2015   • Chronic kidney disease    • Chronic kidney disease 02/03/2015   • Chronic kidney disease 08/13/2015   • Coronary arteriosclerosis    • Dementia without behavioral disturbance 08/13/2015   • Enlarged prostate without lower urinary tract symptoms (luts)    • Gastroparesis    • GERD (gastroesophageal reflux disease)    • Hypercalcemia 02/03/2015   • Hyperlipidemia 08/13/2015   • Hypertension 08/13/2015   • Ischemic cardiomyopathy    • MGUS (monoclonal gammopathy of unknown significance)    • Nonsustained ventricular tachycardia (CMS/HCC)    • KELLY (obstructive sleep apnea)        PAST SURGICAL HISTORY  Past Surgical History:   Procedure Laterality Date   • ANGIOPLASTY      Cath Stent Placement   • CARDIAC DEFIBRILLATOR PLACEMENT      Medtronic singl lead, 2006, [facility]   • CORONARY ARTERY BYPASS GRAFT     • TOE SURGERY         FAMILY HISTORY  Family History   Problem Relation Age of Onset   • Thyroid disease Mother    • Lung disease Father    • Depression Brother    • Cancer Brother         LIver   • Myelodysplastic syndrome Brother    • Stroke Brother        SOCIAL HISTORY  Social History     Socioeconomic History   • Marital status:      Spouse name: Not on file   • Number of children: Not on file   • Years of education: Not on file   • Highest education level: Not on file   Social Needs   • Financial resource strain: Not on file   • Food insecurity - worry: Not on file   • Food insecurity - inability: Not on file   • Transportation needs - medical: Not on file   • Transportation needs - non-medical: Not on file   Occupational History   • Not on file   Tobacco Use   • Smoking status: Former Smoker   • Smokeless tobacco: Never Used   • Tobacco comment: minimal years ago   Substance and Sexual Activity   • Alcohol use: No   • Drug use: No   • Sexual activity: Not on file   Other Topics Concern   • Not on file   Social History Narrative   • Not on file        ALLERGIES  Patient has no known allergies.    REVIEW OF SYSTEMS  Review of Systems   Unable to perform ROS: Dementia   Musculoskeletal: Positive for neck pain.   Neurological: Positive for headaches. Negative for weakness and numbness.       PHYSICAL EXAM  ED Triage Vitals [12/09/18 1247]   Temp Heart Rate Resp BP SpO2   97.7 °F (36.5 °C) 65 18 -- 99 %      Temp src Heart Rate Source Patient Position BP Location FiO2 (%)   Tympanic Monitor -- -- --       Physical Exam   Constitutional: He is well-developed, well-nourished, and in no distress. No distress.   HENT:   Head: Normocephalic and atraumatic.   Eyes: EOM are normal.   Neck: Normal range of motion and full passive range of motion without pain. Neck supple.   Pulmonary/Chest: No respiratory distress.   Abdominal: There is no tenderness.   Musculoskeletal: He exhibits no edema.   Neurological: He is alert.   Skin: Skin is warm and dry.   Psychiatric: Mood and affect normal.   Pt is pleasant and cooperative   Nursing note and vitals reviewed.      RADIOLOGY  CT Head Without Contrast   Preliminary Result   No acute process identified.       Radiation dose reduction techniques were utilized, including automated   exposure control and exposure modulation based on body size.                   I ordered the above noted radiological studies. Interpreted by radiologist. Reviewed by me in PACs.     Procedures      PROGRESS AND CONSULTS     1404: CT head ordered due to pt's symptoms and advanced age of pt.     1500: Pt rechecked and resting comfortably. Discussed negative acute findings on CT and plan for discharge. Pt's family states pt has scheduled PCP appointment and I directed pt and wife to maintain appointment. Pt understands and agrees with plan, all questions addressed.      MEDICAL DECISION MAKING  Results were reviewed/discussed with the patient and they were also made aware of online access. Pt also made aware that some labs, such as cultures, will not  be resulted during ER visit and follow up with PMD is necessary.     MDM  Number of Diagnoses or Management Options  Acute nonintractable headache, unspecified headache type:      Amount and/or Complexity of Data Reviewed  Tests in the radiology section of CPT®: ordered and reviewed (CT - negative)           DIAGNOSIS  Final diagnoses:   Acute nonintractable headache, unspecified headache type       DISPOSITION  DISCHARGE    Patient discharged in stable condition.    Reviewed implications of results, diagnosis, meds, responsibility to follow up, warning signs and symptoms of possible worsening, potential complications and reasons to return to ER.    Patient/Family voiced understanding of above instructions.    Discussed plan for discharge, as there is no emergent indication for admission. Patient referred to primary care provider for BP management due to today's BP. Pt/family is agreeable and understands need for follow up and repeat testing.  Pt is aware that discharge does not mean that nothing is wrong but it indicates no emergency is present that requires admission and they must continue care with follow-up as given below or physician of their choice.     FOLLOW-UP  Evelin Parrish MD  [address] [id number] [phone]      As needed    [facility] Emergency Department  [address] [facility] [address]  [phone]    As needed, If symptoms worsen         Medication List      No changes were made to your prescriptions during this visit.           Latest Documented Vital Signs:  As of 3:07 PM  BP- 147/72 HR- 65 Temp- 97.7 °F (36.5 °C) (Tympanic) O2 sat- 99%    --  Documentation assistance provided by arash Palomino for Dr. Freedman.  Information recorded by the arash was done at my direction and has been verified and validated by me.          Gogo Palomino  12/09/18 3048       Kiel Freedman MD  12/09/18 4639

## 2018-12-11 ENCOUNTER — OFFICE VISIT (OUTPATIENT)
Dept: INTERNAL MEDICINE | Facility: CLINIC | Age: 83
End: 2018-12-11

## 2018-12-11 VITALS
OXYGEN SATURATION: 98 % | RESPIRATION RATE: 18 BRPM | BODY MASS INDEX: 23.55 KG/M2 | SYSTOLIC BLOOD PRESSURE: 122 MMHG | DIASTOLIC BLOOD PRESSURE: 68 MMHG | WEIGHT: 159 LBS | HEIGHT: 69 IN | HEART RATE: 66 BPM

## 2018-12-11 DIAGNOSIS — E78.2 MIXED HYPERLIPIDEMIA: Primary | ICD-10-CM

## 2018-12-11 DIAGNOSIS — E03.9 ACQUIRED HYPOTHYROIDISM: ICD-10-CM

## 2018-12-11 DIAGNOSIS — N18.30 CHRONIC KIDNEY DISEASE (CKD), STAGE III (MODERATE) (HCC): ICD-10-CM

## 2018-12-11 PROCEDURE — 99214 OFFICE O/P EST MOD 30 MIN: CPT | Performed by: INTERNAL MEDICINE

## 2018-12-11 NOTE — PROGRESS NOTES
Chief Complaint  Tyrone Kraft is a 84 y.o. male who presents for Hyperlipidemia and Follow-up (6 month)  .    History of Present Illness   Will is here for routine follow up on his thyroid and HLD .  He was seen in the ER on Saturday.  He had been complaining of pain in his neck but he kept telling the  that he had head pain.  He had a ct of head.  He took a couple doses of aleve and the pain has been gone.  No cp or palp or soa or edema or dizziness.  He is having really dry mouth.  His tsh is mildly elevated.  According to his wife he may have missed one dose but does not regularly miss doses.      Review of Systems   Constitution: Positive for malaise/fatigue and weight loss.   Cardiovascular: Negative for chest pain, dyspnea on exertion and palpitations.   Respiratory: Negative for cough and shortness of breath.    Neurological: Negative for dizziness and light-headedness.   Psychiatric/Behavioral: Positive for memory loss.       Patient Active Problem List   Diagnosis   • Benign paroxysmal positional nystagmus   • Chronic kidney failure   • Chronic kidney disease (CKD), stage III (moderate) (CMS/HCC)   • Enlarged prostate   • HLD (hyperlipidemia)   • MGUS (monoclonal gammopathy of unknown significance)   • Borderline diabetes   • Acquired hypothyroidism   • Elevated white blood cell count   • Perennial allergic rhinitis   • Dementia without behavioral disturbance       Past Medical History:   Diagnosis Date   • BPPV (benign paroxysmal positional vertigo) 02/03/2015    I have put in a script for meclizine to take at night.  If he is not better in a week, will need to send to ENT for Eply maneuvers.   • Chronic kidney disease     Chronic. CR previously 1.6-2 but it was 1.4 in 11/13, f/b Dr. Goff   • Chronic kidney disease 02/03/2015    Stable. he sees Dr. Goff.  I will send his labs to [facility].   • Chronic kidney disease 08/13/2015    Stable.  Cont to monitor q4-6 mo.   • Coronary arteriosclerosis     MI  in 1986 treated medically, had cath and then CABG in 1990 in [place], had stents in 2000 in [place].  Myoview 10/2014  MI but no ischemia.   • Dementia without behavioral disturbance 08/13/2015    Managed by Psychiatrist. I have encouraged his wife to be very vigilant with his driving and his reation time.  According to them, his psychiatrist is OK with him driving.   • Enlarged prostate without lower urinary tract symptoms (luts)    • Gastroparesis    • GERD (gastroesophageal reflux disease)    • Hypercalcemia 02/03/2015    Recheck labs in 3 months.   • Hyperlipidemia 08/13/2015    Very well controlled.  cont current meds.   • Hypertension 08/13/2015    Controlled. Cont current meds.   • Ischemic cardiomyopathy     Reported history, s/p singl lead Medtronic ICD 2006 in [place], no details available; repeat echo 2/2013 showed normal LV size and function, EF 55%   • MGUS (monoclonal gammopathy of unknown significance)    • Nonsustained ventricular tachycardia (CMS/HCC)     Noted incidentally on ECD interrogation   • KELLY (obstructive sleep apnea)        Past Surgical History:   Procedure Laterality Date   • ANGIOPLASTY      Cath Stent Placement   • CARDIAC DEFIBRILLATOR PLACEMENT      Medtronic singl lead, 2006, [facility]   • CORONARY ARTERY BYPASS GRAFT     • TOE SURGERY         Family History   Problem Relation Age of Onset   • Thyroid disease Mother    • Lung disease Father    • Depression Brother    • Cancer Brother         LIver   • Myelodysplastic syndrome Brother    • Stroke Brother        Social History     Socioeconomic History   • Marital status:      Spouse name: Not on file   • Number of children: Not on file   • Years of education: Not on file   • Highest education level: Not on file   Social Needs   • Financial resource strain: Not on file   • Food insecurity - worry: Not on file   • Food insecurity - inability: Not on file   • Transportation needs - medical: Not on file   • Transportation  needs - non-medical: Not on file   Occupational History   • Not on file   Tobacco Use   • Smoking status: Former Smoker   • Smokeless tobacco: Never Used   • Tobacco comment: minimal years ago   Substance and Sexual Activity   • Alcohol use: No   • Drug use: No   • Sexual activity: Not on file   Other Topics Concern   • Not on file   Social History Narrative   • Not on file       Current Outpatient Medications on File Prior to Visit   Medication Sig Dispense Refill   • acebutolol (SECTRAL) 200 MG capsule TAKE 1 CAPSULE DAILY 90 capsule 1   • ARIPiprazole (ABILIFY) 5 MG tablet Take 5 mg by mouth Daily. Take 1/2 tablet daily     • aspirin 81 MG tablet Take by mouth.     • B Complex Vitamins (VITAMIN B COMPLEX) tablet Take 1 tablet by mouth daily.     • benzonatate (TESSALON PERLES) 100 MG capsule Take 1 capsule by mouth Every Night.  0   • cycloSPORINE (RESTASIS) 0.05 % ophthalmic emulsion 1 drop 2 (Two) Times a Day.     • doxycycline (ADOXA) 50 MG tablet TK 1 T PO  BID  4   • esomeprazole (NexIUM) 40 MG capsule Take 1 capsule by mouth daily.     • Glucosamine 500 MG capsule Take by mouth.     • levothyroxine (SYNTHROID, LEVOTHROID) 50 MCG tablet TAKE 1 TABLET DAILY 90 tablet 3   • Loratadine 10 MG capsule Take  by mouth.     • melatonin 5 MG tablet tablet Take 10 mg by mouth Every Night.     • Memantine HCl ER 21 MG capsule sustained-release 24 hr Take by mouth.     • Multiple Vitamins-Minerals (MULTIVITAMIN ADULTS PO) Take  by mouth.     • Omega-3 Fatty Acids (FISH OIL) 1000 MG capsule capsule Take by mouth.     • polycarbophil (FIBERCON) 625 MG tablet Take by mouth.     • rivastigmine (EXELON) 6 MG capsule Take 1 capsule by mouth 2 (two) times a day.     • tamsulosin (FLOMAX) 0.4 MG capsule 24 hr capsule TAKE 1 CAPSULE DAILY 90 capsule 3   • traZODone (DESYREL) 50 MG tablet Take 1 tablet by mouth nightly.     • venlafaxine XR (EFFEXOR-XR) 37.5 MG 24 hr capsule Take by mouth.       No current facility-administered  "medications on file prior to visit.        No Known Allergies    Vitals:    12/11/18 1144   BP: 122/68   Pulse: 66   Resp: 18   SpO2: 98%   Weight: 72.1 kg (159 lb)   Height: 175.3 cm (69.02\")       Body mass index is 23.47 kg/m².    Objective   Physical Exam   Constitutional: He appears well-developed and well-nourished. No distress.   HENT:   Head: Normocephalic and atraumatic.   Eyes: Conjunctivae are normal. No scleral icterus.   Neck: Normal range of motion. Neck supple.   Cardiovascular: Normal rate, regular rhythm and normal heart sounds. Exam reveals no gallop and no friction rub.   No murmur heard.  Pulmonary/Chest: Effort normal and breath sounds normal. No respiratory distress. He has no wheezes. He has no rales.   Musculoskeletal: Normal range of motion. He exhibits no edema.   Lymphadenopathy:     He has no cervical adenopathy.   Neurological: He is alert. No cranial nerve deficit.   Psychiatric: He has a normal mood and affect.       Results for orders placed or performed in visit on 12/03/18   Comprehensive Metabolic Panel   Result Value Ref Range    Glucose 151 (H) 65 - 99 mg/dL    BUN 15 8 - 23 mg/dL    Creatinine 1.54 (H) 0.76 - 1.27 mg/dL    eGFR Non African Am 43 (L) >60 mL/min/1.73    eGFR African Am 52 (L) >60 mL/min/1.73    BUN/Creatinine Ratio 9.7 7.0 - 25.0    Sodium 143 136 - 145 mmol/L    Potassium 4.4 3.5 - 5.2 mmol/L    Chloride 103 98 - 107 mmol/L    Total CO2 28.8 22.0 - 29.0 mmol/L    Calcium 10.5 8.6 - 10.5 mg/dL    Total Protein 6.9 6.0 - 8.5 g/dL    Albumin 4.30 3.50 - 5.20 g/dL    Globulin 2.6 gm/dL    A/G Ratio 1.7 g/dL    Total Bilirubin 0.8 0.1 - 1.2 mg/dL    Alkaline Phosphatase 71 39 - 117 U/L    AST (SGOT) 30 1 - 40 U/L    ALT (SGPT) 30 1 - 41 U/L   TSH Rfx On Abnormal To Free T4   Result Value Ref Range    TSH 4.96 (H) 0.27 - 4.2 mIU/mL   Hemoglobin A1c   Result Value Ref Range    Hemoglobin A1C 5.00 4.80 - 5.60 %   T4F   Result Value Ref Range    Free T4 1.13 0.93 - 1.70 " ng/dL   CBC & Differential   Result Value Ref Range    WBC 10.27 4.50 - 10.70 10*3/mm3    RBC 4.80 4.60 - 6.00 10*6/mm3    Hemoglobin 15.3 13.7 - 17.6 g/dL    Hematocrit 44.3 40.4 - 52.2 %    MCV 92.3 79.8 - 96.2 fL    MCH 31.9 27.0 - 32.7 pg    MCHC 34.5 32.6 - 36.4 g/dL    RDW 13.6 11.5 - 14.5 %    Platelets 151 140 - 500 10*3/mm3    Neutrophil Rel % 66.7 42.7 - 76.0 %    Lymphocyte Rel % 21.1 19.6 - 45.3 %    Monocyte Rel % 7.1 5.0 - 12.0 %    Eosinophil Rel % 4.9 0.3 - 6.2 %    Basophil Rel % 0.2 0.0 - 1.5 %    Neutrophils Absolute 6.85 1.90 - 8.10 10*3/mm3    Lymphocytes Absolute 2.17 0.90 - 4.80 10*3/mm3    Monocytes Absolute 0.73 0.20 - 1.20 10*3/mm3    Eosinophils Absolute 0.50 0.00 - 0.70 10*3/mm3    Basophils Absolute 0.02 0.00 - 0.20 10*3/mm3    Immature Granulocyte Rel % 0.3 0.0 - 0.5 %    Immature Grans Absolute 0.03 0.00 - 0.03 10*3/mm3       Assessment/Plan   Diagnoses and all orders for this visit:    Mixed hyperlipidemia    Acquired hypothyroidism    Chronic kidney disease (CKD), stage III (moderate) (CMS/HCC)        Discussion/Summary  Will is here for routine follow up.  He is generally doing ok.  His labs are stable. His TSH is marginally elevated but FT4 is normal.  We are not going to increase anything at this point. Continue all current meds.    No Follow-up on file.

## 2019-02-11 RX ORDER — TAMSULOSIN HYDROCHLORIDE 0.4 MG/1
CAPSULE ORAL
Qty: 90 CAPSULE | Refills: 1 | Status: SHIPPED | OUTPATIENT
Start: 2019-02-11 | End: 2019-08-21 | Stop reason: SDUPTHER

## 2019-04-16 RX ORDER — ACEBUTOLOL HYDROCHLORIDE 200 MG/1
CAPSULE ORAL
Qty: 90 CAPSULE | Refills: 1 | Status: SHIPPED | OUTPATIENT
Start: 2019-04-16 | End: 2020-07-06

## 2019-08-21 RX ORDER — TAMSULOSIN HYDROCHLORIDE 0.4 MG/1
1 CAPSULE ORAL DAILY
Qty: 90 CAPSULE | Refills: 1 | Status: SHIPPED | OUTPATIENT
Start: 2019-08-21 | End: 2021-01-19

## 2019-09-18 ENCOUNTER — OFFICE VISIT (OUTPATIENT)
Dept: FAMILY MEDICINE CLINIC | Facility: CLINIC | Age: 84
End: 2019-09-18

## 2019-09-18 VITALS
HEART RATE: 69 BPM | TEMPERATURE: 98.5 F | OXYGEN SATURATION: 97 % | WEIGHT: 165.4 LBS | HEIGHT: 69 IN | BODY MASS INDEX: 24.5 KG/M2 | DIASTOLIC BLOOD PRESSURE: 76 MMHG | SYSTOLIC BLOOD PRESSURE: 124 MMHG

## 2019-09-18 DIAGNOSIS — E03.9 ACQUIRED HYPOTHYROIDISM: Primary | ICD-10-CM

## 2019-09-18 DIAGNOSIS — Z79.899 MEDICATION MANAGEMENT: ICD-10-CM

## 2019-09-18 PROCEDURE — 99214 OFFICE O/P EST MOD 30 MIN: CPT | Performed by: NURSE PRACTITIONER

## 2019-09-18 PROCEDURE — G0008 ADMIN INFLUENZA VIRUS VAC: HCPCS | Performed by: NURSE PRACTITIONER

## 2019-09-18 PROCEDURE — 90662 IIV NO PRSV INCREASED AG IM: CPT | Performed by: NURSE PRACTITIONER

## 2019-09-18 NOTE — PROGRESS NOTES
Subjective   Tyrone Kraft is a 85 y.o. male presents to establish care  Hx of hemochromatosis, remotely, states resolved spontaneously.  Hx of heart disease, previous stent and open heart. Has pacemaker/defibrillator, not currently working.  Followed by Dr. Schoenbachler, managing mental health and memory medications.     Spouse is present in room during examination and provides information for spouse.  This is a new patient/problems to this provider.    Hypertension   This is a chronic problem. The current episode started more than 1 year ago. The problem is unchanged. The problem is controlled. Pertinent negatives include no anxiety, blurred vision, chest pain, headaches, malaise/fatigue, neck pain, orthopnea, palpitations, peripheral edema, PND, shortness of breath or sweats. There are no associated agents to hypertension. Risk factors for coronary artery disease include male gender and dyslipidemia. Past treatments include beta blockers. Current antihypertension treatment includes beta blockers. The current treatment provides significant improvement. There are no compliance problems.    Hyperlipidemia   This is a chronic problem. The current episode started more than 1 year ago. Exacerbating diseases include hypothyroidism. There are no known factors aggravating his hyperlipidemia. Pertinent negatives include no chest pain, focal sensory loss, focal weakness, leg pain, myalgias or shortness of breath. Current antihyperlipidemic treatment includes diet change. There are no compliance problems.  Risk factors for coronary artery disease include dyslipidemia, male sex and hypertension.   Hypothyroidism   This is a chronic problem. The current episode started more than 1 year ago. The problem occurs rarely. The problem has been unchanged. Pertinent negatives include no abdominal pain, anorexia, arthralgias, change in bowel habit, chest pain, chills, congestion, coughing, diaphoresis, fatigue, fever, headaches,  joint swelling, myalgias, nausea, neck pain, numbness, rash, sore throat, swollen glands, urinary symptoms, vertigo, visual change, vomiting or weakness. Nothing aggravates the symptoms. Treatments tried: levothyroxine. The treatment provided moderate relief.        The following portions of the patient's history were reviewed and updated as appropriate: allergies, current medications, past family history, past medical history, past social history, past surgical history and problem list.    Review of Systems   Constitutional: Negative for chills, diaphoresis, fatigue, fever and malaise/fatigue.   HENT: Negative for congestion and sore throat.    Eyes: Negative for blurred vision.   Respiratory: Negative for cough and shortness of breath.    Cardiovascular: Negative for chest pain, palpitations, orthopnea and PND.   Gastrointestinal: Negative for abdominal pain, anorexia, change in bowel habit, nausea and vomiting.   Musculoskeletal: Negative for arthralgias, joint swelling, myalgias and neck pain.   Skin: Negative for rash.   Neurological: Negative for vertigo, focal weakness, weakness, numbness and headaches.       Objective   Physical Exam   Constitutional: He appears well-developed and well-nourished. No distress.   HENT:   Head: Normocephalic and atraumatic.   Right Ear: External ear normal.   Left Ear: External ear normal.   Nose: Nose normal.   Mouth/Throat: Oropharynx is clear and moist. No oropharyngeal exudate.   Eyes: Conjunctivae are normal. Pupils are equal, round, and reactive to light.   Neck: Neck supple.   Cardiovascular: Normal rate, regular rhythm and normal heart sounds. Exam reveals no gallop and no friction rub.   No murmur heard.  Pulmonary/Chest: Effort normal and breath sounds normal. No stridor. No respiratory distress. He has no wheezes.   Neurological: He is alert.   Skin: Skin is warm and dry. He is not diaphoretic.   Psychiatric: He has a normal mood and affect.   Vitals  reviewed.      Assessment/Plan   Tyrone was seen today for hyperlipidemia and hypothyroidism.    Diagnoses and all orders for this visit:    Acquired hypothyroidism  -     TSH Rfx On Abnormal To Free T4    Medication management  -     Comprehensive metabolic panel    Other orders  -     FluZone High Dose 65YR+ ([id number])      Will continue current treatment, repeat tsh and cmp, monitor for medication purposes  Follow up in six months, sooner if needed  Update flu shot  Defer psychiatric/dementia treatment to specialist, will refill medication as needed  tsh was mildly increased at last visit 9 months ago, will repeat today  bp is well controlled, tolerating medication  Denies trouble with eating or drinking, sleeping well. Spouse with no acute concerns today.

## 2019-09-19 LAB
ALBUMIN SERPL-MCNC: 4 G/DL (ref 3.5–5.2)
ALBUMIN/GLOB SERPL: 1.4 G/DL
ALP SERPL-CCNC: 103 U/L (ref 39–117)
ALT SERPL-CCNC: 68 U/L (ref 1–41)
AST SERPL-CCNC: 65 U/L (ref 1–40)
BILIRUB SERPL-MCNC: 0.9 MG/DL (ref 0.2–1.2)
BUN SERPL-MCNC: 10 MG/DL (ref 8–23)
BUN/CREAT SERPL: 7.5 (ref 7–25)
CALCIUM SERPL-MCNC: 10.1 MG/DL (ref 8.6–10.5)
CHLORIDE SERPL-SCNC: 101 MMOL/L (ref 98–107)
CO2 SERPL-SCNC: 29.4 MMOL/L (ref 22–29)
CREAT SERPL-MCNC: 1.33 MG/DL (ref 0.76–1.27)
GLOBULIN SER CALC-MCNC: 2.9 GM/DL
GLUCOSE SERPL-MCNC: 140 MG/DL (ref 65–99)
POTASSIUM SERPL-SCNC: 4.3 MMOL/L (ref 3.5–5.2)
PROT SERPL-MCNC: 6.9 G/DL (ref 6–8.5)
SODIUM SERPL-SCNC: 140 MMOL/L (ref 136–145)
T4 FREE SERPL-MCNC: 1.13 NG/DL (ref 0.93–1.7)
TSH SERPL DL<=0.005 MIU/L-ACNC: 4.85 UIU/ML (ref 0.27–4.2)

## 2020-03-28 ENCOUNTER — HOSPITAL ENCOUNTER (EMERGENCY)
Facility: HOSPITAL | Age: 85
Discharge: HOME | End: 2020-03-28
Attending: EMERGENCY MEDICINE | Admitting: EMERGENCY MEDICINE

## 2020-03-28 ENCOUNTER — APPOINTMENT (OUTPATIENT)
Dept: CT IMAGING | Facility: HOSPITAL | Age: 85
End: 2020-03-28

## 2020-03-28 VITALS
SYSTOLIC BLOOD PRESSURE: 129 MMHG | WEIGHT: 160 LBS | HEART RATE: 61 BPM | OXYGEN SATURATION: 93 % | BODY MASS INDEX: 23.7 KG/M2 | TEMPERATURE: 97.5 F | HEIGHT: 69 IN | DIASTOLIC BLOOD PRESSURE: 85 MMHG | RESPIRATION RATE: 16 BRPM
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
## 2020-03-28 DIAGNOSIS — S09.90XA MINOR HEAD INJURY, INITIAL ENCOUNTER: ICD-10-CM

## 2020-03-28 DIAGNOSIS — S01.21XA LACERATION OF NOSE, INITIAL ENCOUNTER: Primary | ICD-10-CM

## 2020-03-28 PROCEDURE — 70450 CT HEAD/BRAIN W/O DYE: CPT

## 2020-03-28 PROCEDURE — 72125 CT NECK SPINE W/O DYE: CPT

## 2020-03-28 PROCEDURE — 99283 EMERGENCY DEPT VISIT LOW MDM: CPT

## 2020-03-28 PROCEDURE — 25010000003 LIDOCAINE 1 % SOLUTION: Performed by: EMERGENCY MEDICINE

## 2020-03-28 RX ORDER — LIDOCAINE HYDROCHLORIDE 10 MG/ML
10 INJECTION, SOLUTION INFILTRATION; PERINEURAL ONCE
Status: COMPLETED | OUTPATIENT
Start: 2020-03-28 | End: 2020-03-28

## 2020-03-28 RX ADMIN — LIDOCAINE HYDROCHLORIDE 5 ML: 10 INJECTION, SOLUTION INFILTRATION; PERINEURAL at 10:43

## 2020-03-30 ENCOUNTER — TELEPHONE (OUTPATIENT)
Dept: FAMILY MEDICINE CLINIC | Facility: CLINIC | Age: 85
End: 2020-03-30

## 2020-03-30 NOTE — TELEPHONE ENCOUNTER
Patients wife called to alert Kinga that the patient was seen Saturday in the ER for a nose laceration. Wife stated he fell asleep on the toilet and he fell and cut his nose. Wife stated he has 2-3 stitches on his nose and that she is a retired nurse and she thinks the wound looks pretty good.     Wife is concerned about bringing him in with the current state of everything.     Wife can be reached at [phone] home number or the cell number [phone].

## 2020-03-30 NOTE — TELEPHONE ENCOUNTER
Spoke to patient and informed him that with COVID and all that we will hold off on having him come in.  I advised him to let us know if the stitches are dissolvable or not.  I also suggested a tele-visit as well

## 2020-07-06 ENCOUNTER — APPOINTMENT (OUTPATIENT)
Dept: CT IMAGING | Facility: HOSPITAL | Age: 85
End: 2020-07-06

## 2020-07-06 ENCOUNTER — HOSPITAL ENCOUNTER (OUTPATIENT)
Facility: HOSPITAL | Age: 85
Setting detail: OBSERVATION
Discharge: HOME | End: 2020-07-08
Attending: EMERGENCY MEDICINE | Admitting: INTERNAL MEDICINE

## 2020-07-06 ENCOUNTER — APPOINTMENT (OUTPATIENT)
Dept: GENERAL RADIOLOGY | Facility: HOSPITAL | Age: 85
End: 2020-07-06

## 2020-07-06 DIAGNOSIS — S12.100A CLOSED ODONTOID FRACTURE, INITIAL ENCOUNTER (HCC): Primary | ICD-10-CM

## 2020-07-06 DIAGNOSIS — E87.5 HYPERKALEMIA: ICD-10-CM

## 2020-07-06 DIAGNOSIS — S12.001A CLOSED NONDISPLACED FRACTURE OF FIRST CERVICAL VERTEBRA, UNSPECIFIED FRACTURE MORPHOLOGY, INITIAL ENCOUNTER (HCC): ICD-10-CM

## 2020-07-06 DIAGNOSIS — S00.03XA CONTUSION OF SCALP, INITIAL ENCOUNTER: ICD-10-CM

## 2020-07-06 PROBLEM — S12.110A ODONTOID FRACTURE (HCC): Status: ACTIVE | Noted: 2020-07-06

## 2020-07-06 PROBLEM — E87.1 HYPONATREMIA: Status: ACTIVE | Noted: 2020-07-06

## 2020-07-06 PROBLEM — R79.89 ELEVATED LFTS: Status: ACTIVE | Noted: 2020-07-06

## 2020-07-06 LAB
ALBUMIN SERPL-MCNC: 3.7 G/DL (ref 3.5–5.2)
ALBUMIN/GLOB SERPL: 1.2 G/DL
ALP SERPL-CCNC: 171 U/L (ref 39–117)
ALT SERPL W P-5'-P-CCNC: 62 U/L (ref 1–41)
ANION GAP SERPL CALCULATED.3IONS-SCNC: 4.3 MMOL/L (ref 5–15)
AST SERPL-CCNC: 47 U/L (ref 1–40)
BASOPHILS # BLD AUTO: 0.05 10*3/MM3 (ref 0–0.2)
BASOPHILS NFR BLD AUTO: 0.5 % (ref 0–1.5)
BILIRUB SERPL-MCNC: 0.8 MG/DL (ref 0.2–1.2)
BILIRUB UR QL STRIP: NEGATIVE
BUN SERPL-MCNC: 11 MG/DL (ref 8–23)
BUN/CREAT SERPL: 7.9 (ref 7–25)
CALCIUM SPEC-SCNC: 10 MG/DL (ref 8.6–10.5)
CHLORIDE SERPL-SCNC: 102 MMOL/L (ref 98–107)
CLARITY UR: CLEAR
CO2 SERPL-SCNC: 27.7 MMOL/L (ref 22–29)
COLOR UR: YELLOW
CREAT SERPL-MCNC: 1.39 MG/DL (ref 0.76–1.27)
DEPRECATED RDW RBC AUTO: 46 FL (ref 37–54)
EOSINOPHIL # BLD AUTO: 0.43 10*3/MM3 (ref 0–0.4)
EOSINOPHIL NFR BLD AUTO: 3.9 % (ref 0.3–6.2)
ERYTHROCYTE [DISTWIDTH] IN BLOOD BY AUTOMATED COUNT: 13.2 % (ref 12.3–15.4)
GFR SERPL CREATININE-BSD FRML MDRD: 48 ML/MIN/1.73
GLOBULIN UR ELPH-MCNC: 3 GM/DL
GLUCOSE SERPL-MCNC: 126 MG/DL (ref 65–99)
GLUCOSE UR STRIP-MCNC: NEGATIVE MG/DL
HCT VFR BLD AUTO: 41 % (ref 37.5–51)
HGB BLD-MCNC: 13.7 G/DL (ref 13–17.7)
HGB UR QL STRIP.AUTO: NEGATIVE
IMM GRANULOCYTES # BLD AUTO: 0.06 10*3/MM3 (ref 0–0.05)
IMM GRANULOCYTES NFR BLD AUTO: 0.5 % (ref 0–0.5)
KETONES UR QL STRIP: NEGATIVE
LEUKOCYTE ESTERASE UR QL STRIP.AUTO: NEGATIVE
LYMPHOCYTES # BLD AUTO: 1.93 10*3/MM3 (ref 0.7–3.1)
LYMPHOCYTES NFR BLD AUTO: 17.4 % (ref 19.6–45.3)
MCH RBC QN AUTO: 31.3 PG (ref 26.6–33)
MCHC RBC AUTO-ENTMCNC: 33.4 G/DL (ref 31.5–35.7)
MCV RBC AUTO: 93.6 FL (ref 79–97)
MONOCYTES # BLD AUTO: 0.9 10*3/MM3 (ref 0.1–0.9)
MONOCYTES NFR BLD AUTO: 8.1 % (ref 5–12)
NEUTROPHILS NFR BLD AUTO: 69.6 % (ref 42.7–76)
NEUTROPHILS NFR BLD AUTO: 7.73 10*3/MM3 (ref 1.7–7)
NITRITE UR QL STRIP: NEGATIVE
NRBC BLD AUTO-RTO: 0 /100 WBC (ref 0–0.2)
PH UR STRIP.AUTO: 5.5 [PH] (ref 5–8)
PLATELET # BLD AUTO: 154 10*3/MM3 (ref 140–450)
PMV BLD AUTO: 9.2 FL (ref 6–12)
POTASSIUM SERPL-SCNC: 4.9 MMOL/L (ref 3.5–5.2)
POTASSIUM SERPL-SCNC: 5.4 MMOL/L (ref 3.5–5.2)
PROT SERPL-MCNC: 6.7 G/DL (ref 6–8.5)
PROT UR QL STRIP: ABNORMAL
RBC # BLD AUTO: 4.38 10*6/MM3 (ref 4.14–5.8)
SODIUM SERPL-SCNC: 134 MMOL/L (ref 136–145)
SP GR UR STRIP: 1.01 (ref 1–1.03)
TROPONIN T SERPL-MCNC: <0.01 NG/ML (ref 0–0.03)
UROBILINOGEN UR QL STRIP: ABNORMAL
WBC # BLD AUTO: 11.1 10*3/MM3 (ref 3.4–10.8)

## 2020-07-06 PROCEDURE — 25010000002 CALCIUM GLUCONATE PER 10 ML: Performed by: PHYSICIAN ASSISTANT

## 2020-07-06 PROCEDURE — 84132 ASSAY OF SERUM POTASSIUM: CPT | Performed by: INTERNAL MEDICINE

## 2020-07-06 PROCEDURE — 80053 COMPREHEN METABOLIC PANEL: CPT | Performed by: PHYSICIAN ASSISTANT

## 2020-07-06 PROCEDURE — 96361 HYDRATE IV INFUSION ADD-ON: CPT

## 2020-07-06 PROCEDURE — 93005 ELECTROCARDIOGRAM TRACING: CPT | Performed by: PHYSICIAN ASSISTANT

## 2020-07-06 PROCEDURE — 93010 ELECTROCARDIOGRAM REPORT: CPT | Performed by: INTERNAL MEDICINE

## 2020-07-06 PROCEDURE — 96376 TX/PRO/DX INJ SAME DRUG ADON: CPT

## 2020-07-06 PROCEDURE — 99214 OFFICE O/P EST MOD 30 MIN: CPT | Performed by: NURSE PRACTITIONER

## 2020-07-06 PROCEDURE — 70450 CT HEAD/BRAIN W/O DYE: CPT

## 2020-07-06 PROCEDURE — 99285 EMERGENCY DEPT VISIT HI MDM: CPT

## 2020-07-06 PROCEDURE — 63710000001 INSULIN REGULAR HUMAN PER 5 UNITS: Performed by: INTERNAL MEDICINE

## 2020-07-06 PROCEDURE — 84484 ASSAY OF TROPONIN QUANT: CPT | Performed by: PHYSICIAN ASSISTANT

## 2020-07-06 PROCEDURE — 96374 THER/PROPH/DIAG INJ IV PUSH: CPT

## 2020-07-06 PROCEDURE — 81003 URINALYSIS AUTO W/O SCOPE: CPT | Performed by: PHYSICIAN ASSISTANT

## 2020-07-06 PROCEDURE — G0378 HOSPITAL OBSERVATION PER HR: HCPCS

## 2020-07-06 PROCEDURE — 25010000002 MORPHINE PER 10 MG: Performed by: NURSE PRACTITIONER

## 2020-07-06 PROCEDURE — 71045 X-RAY EXAM CHEST 1 VIEW: CPT

## 2020-07-06 PROCEDURE — 72125 CT NECK SPINE W/O DYE: CPT

## 2020-07-06 PROCEDURE — 85025 COMPLETE CBC W/AUTO DIFF WBC: CPT | Performed by: PHYSICIAN ASSISTANT

## 2020-07-06 PROCEDURE — 96375 TX/PRO/DX INJ NEW DRUG ADDON: CPT

## 2020-07-06 PROCEDURE — 25010000002 MORPHINE PER 10 MG: Performed by: EMERGENCY MEDICINE

## 2020-07-06 RX ORDER — ACETAMINOPHEN 325 MG/1
650 TABLET ORAL EVERY 4 HOURS PRN
Status: DISCONTINUED | OUTPATIENT
Start: 2020-07-06 | End: 2020-07-08 | Stop reason: HOSPADM

## 2020-07-06 RX ORDER — MORPHINE SULFATE 2 MG/ML
2 INJECTION, SOLUTION INTRAMUSCULAR; INTRAVENOUS EVERY 4 HOURS PRN
Status: DISCONTINUED | OUTPATIENT
Start: 2020-07-06 | End: 2020-07-08 | Stop reason: HOSPADM

## 2020-07-06 RX ORDER — DEXTROSE MONOHYDRATE 25 G/50ML
50 INJECTION, SOLUTION INTRAVENOUS ONCE
Status: COMPLETED | OUTPATIENT
Start: 2020-07-06 | End: 2020-07-06

## 2020-07-06 RX ORDER — ACEBUTOLOL HYDROCHLORIDE 200 MG/1
200 CAPSULE ORAL DAILY
COMMUNITY
End: 2021-01-19

## 2020-07-06 RX ORDER — MORPHINE SULFATE 2 MG/ML
2 INJECTION, SOLUTION INTRAMUSCULAR; INTRAVENOUS ONCE
Status: COMPLETED | OUTPATIENT
Start: 2020-07-06 | End: 2020-07-06

## 2020-07-06 RX ORDER — ONDANSETRON 2 MG/ML
4 INJECTION INTRAMUSCULAR; INTRAVENOUS EVERY 6 HOURS PRN
Status: DISCONTINUED | OUTPATIENT
Start: 2020-07-06 | End: 2020-07-08 | Stop reason: HOSPADM

## 2020-07-06 RX ORDER — SODIUM CHLORIDE 9 MG/ML
100 INJECTION, SOLUTION INTRAVENOUS CONTINUOUS
Status: DISCONTINUED | OUTPATIENT
Start: 2020-07-06 | End: 2020-07-08

## 2020-07-06 RX ORDER — MEMANTINE HYDROCHLORIDE 10 MG/1
20 TABLET ORAL DAILY
COMMUNITY

## 2020-07-06 RX ORDER — LEVOTHYROXINE SODIUM 0.05 MG/1
50 TABLET ORAL DAILY
COMMUNITY
End: 2020-12-08

## 2020-07-06 RX ORDER — ACETAMINOPHEN 160 MG/5ML
650 SOLUTION ORAL EVERY 4 HOURS PRN
Status: DISCONTINUED | OUTPATIENT
Start: 2020-07-06 | End: 2020-07-08 | Stop reason: HOSPADM

## 2020-07-06 RX ORDER — SODIUM CHLORIDE 0.9 % (FLUSH) 0.9 %
10 SYRINGE (ML) INJECTION AS NEEDED
Status: DISCONTINUED | OUTPATIENT
Start: 2020-07-06 | End: 2020-07-08 | Stop reason: HOSPADM

## 2020-07-06 RX ORDER — SODIUM CHLORIDE 0.9 % (FLUSH) 0.9 %
10 SYRINGE (ML) INJECTION EVERY 12 HOURS SCHEDULED
Status: DISCONTINUED | OUTPATIENT
Start: 2020-07-06 | End: 2020-07-08 | Stop reason: HOSPADM

## 2020-07-06 RX ORDER — ACETAMINOPHEN 650 MG/1
650 SUPPOSITORY RECTAL EVERY 4 HOURS PRN
Status: DISCONTINUED | OUTPATIENT
Start: 2020-07-06 | End: 2020-07-08 | Stop reason: HOSPADM

## 2020-07-06 RX ORDER — NITROGLYCERIN 0.4 MG/1
0.4 TABLET SUBLINGUAL
Status: DISCONTINUED | OUTPATIENT
Start: 2020-07-06 | End: 2020-07-08 | Stop reason: HOSPADM

## 2020-07-06 RX ADMIN — SODIUM CHLORIDE, PRESERVATIVE FREE 10 ML: 5 INJECTION INTRAVENOUS at 23:07

## 2020-07-06 RX ADMIN — MORPHINE SULFATE 2 MG: 2 INJECTION, SOLUTION INTRAMUSCULAR; INTRAVENOUS at 15:28

## 2020-07-06 RX ADMIN — DEXTROSE MONOHYDRATE 50 ML: 500 INJECTION PARENTERAL at 15:31

## 2020-07-06 RX ADMIN — INSULIN HUMAN 10 UNITS: 100 INJECTION, SOLUTION PARENTERAL at 16:14

## 2020-07-06 RX ADMIN — MORPHINE SULFATE 2 MG: 2 INJECTION, SOLUTION INTRAMUSCULAR; INTRAVENOUS at 23:03

## 2020-07-06 RX ADMIN — SODIUM CHLORIDE 100 ML/HR: 9 INJECTION, SOLUTION INTRAVENOUS at 18:37

## 2020-07-06 RX ADMIN — CALCIUM GLUCONATE 1 G: 98 INJECTION, SOLUTION INTRAVENOUS at 15:36

## 2020-07-06 NOTE — CONSULTS
Neurosurgery (on-call MD unless specified)  Consult performed by: Rhina Barrett APRN  Consult ordered by: Allie Acevedo PA  Reason for consult: cervical fracture following a fall          Patient Care Team:  Kinga Peguero APRN as PCP - General (Family Medicine)    Chief complaint: Head trauma, neck pain after a fall    Subjective     History of Present Illness     This is a very pleasant 86-year-old male with a history of benign positional vertigo, chronic kidney disease 3, coronary artery disease with MI and CABG in 1990, hypertension. I am seeing him in the emergency department where he presented for a fall as he was trying to exit the front of his home and descend onto a porch.  The patient stated that he felt dizzy.  He subsequently lost his balance and fell.  He struck the left side of his head on the pavement.  The patient did not lose consciousness.  He was having pain in the upper aspect of the cervical spine and also soft tissue discomfort in the area of the left scalp hematoma.  CT scan of the brain was performed and showed no evidence of acute intracranial hemorrhage, calvarial fracture.  CT scan of the cervical spine however revealed an acute hairline, nondisplaced fracture at the right posterior C1 ring.  Severe arthritic changes also noted in the cervical spine.  The remainder of the cervical CT results will be discussed below.  Surgery has been asked to give an opinion regarding the odontoid fractures.      Review of Systems   Constitutional: Positive for activity change.   Musculoskeletal: Positive for neck pain and neck stiffness.   Neurological: Positive for dizziness.   Psychiatric/Behavioral: Positive for confusion.   All other systems reviewed and are negative.             Past Medical History:   Diagnosis Date   • BPPV (benign paroxysmal positional vertigo) 02/03/2015    I have put in a script for meclizine to take at night.  If he is not better in a week, will need to send to  ENT for Eply maneuvers.   • Chronic kidney disease     Chronic. CR previously 1.6-2 but it was 1.4 in 11/13, f/b Dr. Goff   • Chronic kidney disease 02/03/2015    Stable. he sees Dr. Goff.  I will send his labs to [place].   • Chronic kidney disease 08/13/2015    Stable.  Cont to monitor q4-6 mo.   • Coronary arteriosclerosis     MI in 1986 treated medically, had cath and then CABG in 1990 in [place], had stents in 2000 in [place].  Myoview 10/2014  MI but no ischemia.   • Dementia without behavioral disturbance (CMS/HCC) 08/13/2015    Managed by Psychiatrist. I have encouraged his wife to be very vigilant with his driving and his reation time.  According to them, his psychiatrist is OK with him driving.   • Enlarged prostate without lower urinary tract symptoms (luts)    • Gastroparesis    • GERD (gastroesophageal reflux disease)    • Hypercalcemia 02/03/2015    Recheck labs in 3 months.   • Hyperlipidemia 08/13/2015    Very well controlled.  cont current meds.   • Hypertension 08/13/2015    Controlled. Cont current meds.   • Ischemic cardiomyopathy     Reported history, s/p singl lead Medtronic ICD 2006 in [place], no details available; repeat echo 2/2013 showed normal LV size and function, EF 55%   • MGUS (monoclonal gammopathy of unknown significance)    • Nonsustained ventricular tachycardia (CMS/HCC)     Noted incidentally on ECD interrogation   • KELLY (obstructive sleep apnea)    ,   Past Surgical History:   Procedure Laterality Date   • ANGIOPLASTY      Cath Stent Placement   • CARDIAC DEFIBRILLATOR PLACEMENT      Medtronic singl lead, 2006, [facility]   • CORONARY ARTERY BYPASS GRAFT     • TOE SURGERY     ,   Family History   Problem Relation Age of Onset   • Thyroid disease Mother    • Lung disease Father    • Depression Brother    • Cancer Brother         LIver   • Myelodysplastic syndrome Brother    • Stroke Brother    ,   Social History     Tobacco Use   • Smoking status: Former Smoker   •  Smokeless tobacco: Never Used   • Tobacco comment: minimal years ago   Substance Use Topics   • Alcohol use: No   • Drug use: No   ,   Medications Prior to Admission   Medication Sig Dispense Refill Last Dose   • acebutolol (SECTRAL) 200 MG capsule Take 200 mg by mouth Daily.   7/5/2020 at Unknown time   • ARIPiprazole (ABILIFY) 2 MG tablet Take 4 mg by mouth Daily.   7/5/2020 at Unknown time   • aspirin 81 MG tablet Take 81 mg by mouth Daily.   7/5/2020 at Unknown time   • cycloSPORINE (RESTASIS) 0.05 % ophthalmic emulsion Administer 1 drop to both eyes 2 (Two) Times a Day.   7/5/2020 at Unknown time   • esomeprazole (NexIUM) 40 MG capsule Take 1 capsule by mouth daily.   7/5/2020 at Unknown time   • Glucosamine 500 MG capsule Take 1 capsule by mouth Daily.   7/5/2020 at Unknown time   • levothyroxine (SYNTHROID, LEVOTHROID) 50 MCG tablet Take 50 mcg by mouth Daily.   7/6/2020 at Unknown time   • Loratadine 10 MG capsule Take 1 tablet by mouth Daily.   7/5/2020 at Unknown time   • melatonin 5 MG tablet tablet Take 10 mg by mouth Every Night.   7/5/2020 at Unknown time   • memantine (NAMENDA) 10 MG tablet Take 20 mg by mouth Daily.   7/5/2020 at Unknown time   • Multiple Vitamins-Minerals (MULTIVITAMIN ADULTS PO) Take 1 tablet by mouth Daily.   7/5/2020 at Unknown time   • Omega-3 Fatty Acids (FISH OIL) 1000 MG capsule capsule Take 1,000 mg by mouth Daily With Breakfast.   7/5/2020 at Unknown time   • polycarbophil (FIBERCON) 625 MG tablet Take 1,350 mg by mouth 2 (two) times a day.   7/5/2020 at Unknown time   • rivastigmine (EXELON) 6 MG capsule Take 1 capsule by mouth 2 (two) times a day.   7/5/2020 at Unknown time   • tamsulosin (FLOMAX) 0.4 MG capsule 24 hr capsule Take 1 capsule by mouth Daily. (Patient taking differently: Take 1 capsule by mouth Every Night.) 90 capsule 1 7/5/2020 at Unknown time   • traZODone (DESYREL) 50 MG tablet Take 1 tablet by mouth nightly.   7/5/2020 at Unknown time   • venlafaxine XR  (EFFEXOR-XR) 37.5 MG 24 hr capsule Take 37.5 mg by mouth Daily.   7/5/2020 at Unknown time   , Scheduled Meds:    sodium chloride 10 mL Intravenous Q12H   , Continuous Infusions:    sodium chloride 100 mL/hr Last Rate: 100 mL/hr (07/06/20 1837)   , PRN Meds:  •  acetaminophen **OR** acetaminophen **OR** acetaminophen  •  nitroglycerin  •  ondansetron  •  sodium chloride and Allergies:  Patient has no known allergies.    Objective      Vital Signs  Temp:  [98.8 °F (37.1 °C)] 98.8 °F (37.1 °C)  Heart Rate:  [64-71] 64  Resp:  [16] 16  BP: (154-166)/(76-78) 166/76    Physical Exam   Constitutional: He is oriented to person, place, and time. He appears well-developed and well-nourished. He is cooperative. No distress.   HENT:   Head: Normocephalic.   Large left frontal soft tissue hematoma with associated ecchymosis.  No otorrhea or rhinorrhea.   Eyes: Pupils are equal, round, and reactive to light. Conjunctivae and EOM are normal. Right eye exhibits no discharge. Left eye exhibits no discharge.   Neck: Neck supple. No tracheal deviation present.   Wearing Aspen collar. Some tenderness with palpation of upper cervical region   Cardiovascular: Normal rate and intact distal pulses.   Pulmonary/Chest: Effort normal. No respiratory distress.   Abdominal: Soft. He exhibits no distension. There is no tenderness.   Musculoskeletal: Normal range of motion. He exhibits no edema or tenderness.   Neurological: He is alert and oriented to person, place, and time. He has normal reflexes. He displays normal reflexes. No sensory deficit. He exhibits normal muscle tone. Coordination normal. GCS eye subscore is 4. GCS verbal subscore is 5. GCS motor subscore is 6.   No motor or sensory deficits. DTR's normal. Negative Johnson's; negative clonus.  Moves all 4 extremities on command. No drift, no tremor, no dysmetria.   Skin: Skin is warm and dry. He is not diaphoretic. No erythema.   Psychiatric: He has a normal mood and affect. Thought  content normal.   Vitals reviewed.         Results Review:    I reviewed the patient's new clinical results.  I reviewed the patient's new imaging results and agree with the interpretation.  Discussed with Dr. Pennington       NONCONTRAST CERVICAL SPINE CT 07/06/2020    Acute hairline nondisplaced fracture of the right posterior  ring of C1. Furthermore, there is a hairline nondisplaced fracture  through the base of the odontoid process compatible with a hairline  nondisplaced type II odontoid fracture. Severe arthritic changes at atlantodental interval with thickening of the transverse ligament. Diffuse cervical spondylosis noted and unchanged from previous imaging 3/28/20.    NONCONTRAST HEAD CT  07/06/2020    Very large scalp hematoma over the left frontal bone. No acute intracranial pathology.         Assessment/Plan       Odontoid fracture (CMS/HCC)    Fall with head trauma     Neck pain     Type II odontoid fracture, non-displaced    Acute C1 ring fracture, non-displaced    Degenerative cervical spondylosis           Continue hard cervical collar     Repeat head CT in am      I discussed the patients findings and my recommendations with patient and family    AJ Richards  07/06/20  13:59

## 2020-07-06 NOTE — H&P
[facility] Admission H&P    Patient Care Team:  Kinga Peguero APRN as PCP - General (Family Medicine)    Chief complaint: Fall at home    History of Present Illness    This is an 86-year-old male with history of chronic kidney disease stage III, CAD with history of CABG and stenting, dementia, history of ischemic cardiomyopathy with improved ejection fraction and presence of AICD which is not working due to dead battery and decision not to replace the generator who presented to the emergency room after what he describes as a mechanical fall while stepping out onto his porch.  He fell forward and hit his forehead on the concrete.  The patient denies any associated dizziness, lightheadedness, chest pain, palpitations, shortness of breath.  He denies any loss of consciousness.  He suffered a large left forehead/scalp hematoma and CT scan of the cervical spine showed C2 and odontoid fracture.  No intracranial bleed identified.  Patient has been feeling well lately with no illness, decreased oral intake.  His wife states that he is unsteady on his feet in general and does suffer a fall from time to time.  No reports of confusion.  His only medication change was increase of his Abilify dose 2 weeks ago.  He has been taking Naprosyn once daily for chronic hip pain.    Past Medical History:   Diagnosis Date   • BPPV (benign paroxysmal positional vertigo) 02/03/2015    I have put in a script for meclizine to take at night.  If he is not better in a week, will need to send to ENT for Eply maneuvers.   • Chronic kidney disease     Chronic. CR previously 1.6-2 but it was 1.4 in 11/13, f/b Dr. Goff   • Chronic kidney disease 02/03/2015    Stable. he sees Dr. Goff.  I will send his labs to [place].   • Chronic kidney disease 08/13/2015    Stable.  Cont to monitor q4-6 mo.   • Coronary arteriosclerosis     MI in 1986 treated medically, had cath and then CABG in 1990 in [place], had stents in 2000 in [place].  Myoview  10/2014  MI but no ischemia.   • Dementia without behavioral disturbance (CMS/HCC) 08/13/2015    Managed by Psychiatrist. I have encouraged his wife to be very vigilant with his driving and his reation time.  According to them, his psychiatrist is OK with him driving.   • Enlarged prostate without lower urinary tract symptoms (luts)    • Gastroparesis    • GERD (gastroesophageal reflux disease)    • Hypercalcemia 02/03/2015    Recheck labs in 3 months.   • Hyperlipidemia 08/13/2015    Very well controlled.  cont current meds.   • Hypertension 08/13/2015    Controlled. Cont current meds.   • Ischemic cardiomyopathy     Reported history, s/p singl lead Medtronic ICD 2006 in [place], no details available; repeat echo 2/2013 showed normal LV size and function, EF 55%   • MGUS (monoclonal gammopathy of unknown significance)    • Nonsustained ventricular tachycardia (CMS/HCC)     Noted incidentally on ECD interrogation   • KELLY (obstructive sleep apnea)      Past Surgical History:   Procedure Laterality Date   • ANGIOPLASTY      Cath Stent Placement   • CARDIAC DEFIBRILLATOR PLACEMENT      Medtronic singl lead, 2006, [facility]   • CORONARY ARTERY BYPASS GRAFT     • TOE SURGERY       Family History   Problem Relation Age of Onset   • Thyroid disease Mother    • Lung disease Father    • Depression Brother    • Cancer Brother         LIver   • Myelodysplastic syndrome Brother    • Stroke Brother      Social History     Tobacco Use   • Smoking status: Former Smoker   • Smokeless tobacco: Never Used   • Tobacco comment: minimal years ago   Substance Use Topics   • Alcohol use: No   • Drug use: No       (Not in a hospital admission)  Allergies:  Patient has no known allergies.    Review of Systems   Constitutional: Negative for chills and fever.   HENT: Negative for congestion and sore throat.    Eyes: Negative for visual disturbance.   Respiratory: Negative for cough, chest tightness, shortness of breath and wheezing.     Cardiovascular: Negative for chest pain, palpitations and leg swelling.   Gastrointestinal: Negative for abdominal distention, abdominal pain, diarrhea, nausea and vomiting.   Endocrine: Negative for polydipsia and polyuria.   Genitourinary: Negative for difficulty urinating, dysuria, frequency and urgency.   Musculoskeletal: Negative for arthralgias and myalgias.        Pain to the left scalp and forehead secondary to large hematoma.  Chronic hip pain   Skin: Negative for color change and rash.   Neurological: Positive for weakness. Negative for dizziness, syncope and light-headedness.        Generalized weakness and unsteadiness on his feet which is chronic        PHYSICAL EXAM    Vital Signs  tMax 24 hrs:  Temp (24hrs), Av.8 °F (37.1 °C), Min:98.8 °F (37.1 °C), Max:98.8 °F (37.1 °C)    Vitals Ranges:  Temp:  [98.8 °F (37.1 °C)] 98.8 °F (37.1 °C)  Heart Rate:  [64-73] 73  Resp:  [16] 16  BP: (154-177)/(76-90) 177/90    Physical Exam   Constitutional: He is oriented to person, place, and time. He appears well-developed. No distress.   Weak, frail, deconditioned appearing   HENT:   Head: Normocephalic.   Large left forehead and scalp hematoma with associated bruising.   Eyes: Pupils are equal, round, and reactive to light. EOM are normal.   Neck: No tracheal deviation present.   Hard cervical collar in place   Cardiovascular: Normal rate and regular rhythm. Exam reveals no gallop.   No murmur heard.  Pulmonary/Chest: Effort normal. No respiratory distress. He has no wheezes.   Abdominal: Soft. Bowel sounds are normal. He exhibits no distension. There is no tenderness.   Musculoskeletal: He exhibits no edema or tenderness.   Neurological: He is alert and oriented to person, place, and time. No cranial nerve deficit.   Skin: Skin is warm and dry.   Nursing note and vitals reviewed.      Results Review:  Results from last 7 days   Lab Units 20  1322   WBC 10*3/mm3 11.10*   HEMOGLOBIN g/dL 13.7   HEMATOCRIT  % 41.0   PLATELETS 10*3/mm3 154     Results from last 7 days   Lab Units 07/06/20  1322   SODIUM mmol/L 134*   POTASSIUM mmol/L 5.4*   CHLORIDE mmol/L 102   CO2 mmol/L 27.7   BUN mg/dL 11   CREATININE mg/dL 1.39*   CALCIUM mg/dL 10.0   BILIRUBIN mg/dL 0.8   ALK PHOS U/L 171*   ALT (SGPT) U/L 62*   AST (SGOT) U/L 47*   GLUCOSE mg/dL 126*        I reviewed the patient's new clinical results.  I reviewed the patient's new imaging results and agree with the interpretation.  I personally viewed and interpreted the patient's EKG/Telemetry data        Active Hospital Problems    Diagnosis  POA   • Odontoid fracture (CMS/HCC) [S12.110A]  Yes   • C2 cervical fracture (CMS/HCC) [S12.100A]  Yes   • Hyperkalemia [E87.5]  Unknown   • Hyponatremia [E87.1]  Unknown   • Elevated LFTs [R79.89]  Unknown   • Dementia without behavioral disturbance (CMS/HCC) [F03.90]  Yes   • Acquired hypothyroidism [E03.9]  Yes   • Chronic kidney disease (CKD), stage III (moderate) (CMS/HCC) [N18.3]  Yes   • HLD (hyperlipidemia) [E78.5]  Yes      Resolved Hospital Problems   No resolved problems to display.       Assessment & Plan    The patient will be admitted.  Neurosurgery has been consulted for the cervical fractures.  He will remain in a hard cervical collar and will await their evaluation.  Supportive care for the left forehead/scalp hematoma.  We will hold his aspirin.  He is not on any other blood thinners.  Lab work-up reveals chronic kidney disease which appears to be at his baseline.  He is slightly hyperkalemic.  He will be given insulin/dextrose, calcium gluconate, and will start him on gentle IV fluids.  We will repeat a potassium level this evening.  LFTs are slightly elevated and will repeat with labs tomorrow morning.  Will hold any nephrotoxic or hepatotoxic medications.  We will see how his mild hyponatremia responds to gentle IV fluids.  From what I can tell, by all accounts, his fall was mechanical in nature and both he and his  wife deny any loss of consciousness.  We will monitor him on telemetry for now.  Additional plans based on his clinical course.    I discussed the patients findings and my recommendations with patient and family    Cresencio Kay MD  07/06/20  2:28 PM

## 2020-07-06 NOTE — ED NOTES
Spouse reports pt still has defibrillator in place but that it is no good anymore     [place] Cline, RN  07/06/20 1682

## 2020-07-06 NOTE — ED PROVIDER NOTES
EMERGENCY DEPARTMENT ENCOUNTER    Room Number:  28/28  Date seen:  7/6/2020  Time seen: 12:56 PM  PCP: Kinga Peguero APRN  Historian: patient      HPI:  Chief Complaint: neck pain, head injury    A complete HPI/ROS/PMH/PSH/SH/FH are unobtainable due to: none    Context: Tyrone Kraft is a 86 y.o. male who presents to the ED for evaluation of left forehead swelling and neck pain after falling just prior to arrival.  He states that he was on the front porch and that he slipped and fell.  He did tell triage that he got dizzy and fell.  He struck his head on the concrete, takes baby aspirin but no other anticoagulants.  The pain in his neck is constant moderate and worsens with any range of motion and improves when he holds his neck still.  He denies any headache, change in vision, nausea or vomiting, any pain in his back or extremities as well as any pain in his chest or abdomen and any shortness of breath.  He does not feel dizzy currently.        PAST MEDICAL HISTORY  Active Ambulatory Problems     Diagnosis Date Noted   • Benign paroxysmal positional nystagmus 01/20/2016   • Chronic kidney failure 01/20/2016   • Chronic kidney disease (CKD), stage III (moderate) (CMS/[facility]) 01/20/2016   • Enlarged prostate 01/20/2016   • HLD (hyperlipidemia) 01/20/2016   • MGUS (monoclonal gammopathy of unknown significance) 01/20/2016   • Borderline diabetes 01/20/2016   • Acquired hypothyroidism 02/16/2016   • Elevated white blood cell count 02/16/2016   • Perennial allergic rhinitis 12/06/2016   • Dementia without behavioral disturbance (CMS/[facility]) 06/05/2018     Resolved Ambulatory Problems     Diagnosis Date Noted   • Abnormal thyroid stimulating hormone level 01/20/2016   • Chest discomfort 01/20/2016   • BP (high blood pressure) 01/20/2016   • Cervical pain 01/20/2016   • Back ache 01/20/2016   • Pain in shoulder 01/20/2016   • Injury of left shoulder 01/21/2016   • Tendinopathy of left rotator cuff 01/21/2016   •  Elevated liver enzymes 02/16/2016     Past Medical History:   Diagnosis Date   • BPPV (benign paroxysmal positional vertigo) 02/03/2015   • Chronic kidney disease    • Chronic kidney disease 02/03/2015   • Chronic kidney disease 08/13/2015   • Coronary arteriosclerosis    • Enlarged prostate without lower urinary tract symptoms (luts)    • Gastroparesis    • GERD (gastroesophageal reflux disease)    • Hypercalcemia 02/03/2015   • Hyperlipidemia 08/13/2015   • Hypertension 08/13/2015   • Ischemic cardiomyopathy    • Nonsustained ventricular tachycardia (CMS/HCC)    • KELLY (obstructive sleep apnea)          PAST SURGICAL HISTORY  Past Surgical History:   Procedure Laterality Date   • ANGIOPLASTY      Cath Stent Placement   • CARDIAC DEFIBRILLATOR PLACEMENT      [employer] singl lead, 2006, [facility]   • CORONARY ARTERY BYPASS GRAFT     • TOE SURGERY           FAMILY HISTORY  Family History   Problem Relation Age of Onset   • Thyroid disease Mother    • Lung disease Father    • Depression Brother    • Cancer Brother         LIver   • Myelodysplastic syndrome Brother    • Stroke Brother          SOCIAL HISTORY  Social History     Socioeconomic History   • Marital status:      Spouse name: Not on file   • Number of children: Not on file   • Years of education: Not on file   • Highest education level: Not on file   Tobacco Use   • Smoking status: Former Smoker   • Smokeless tobacco: Never Used   • Tobacco comment: minimal years ago   Substance and Sexual Activity   • Alcohol use: No   • Drug use: No   • Sexual activity: Defer         ALLERGIES  Patient has no known allergies.        REVIEW OF SYSTEMS  Review of Systems     All systems reviewed and negative except for those discussed in HPI.       PHYSICAL EXAM  ED Triage Vitals [07/06/20 1147]   Temp Heart Rate Resp BP SpO2   98.8 °F (37.1 °C) 71 16 154/78 92 %      Temp src Heart Rate Source Patient Position BP Location FiO2 (%)   Tympanic Monitor -- -- --          GENERAL: not distressed  HENT: Large left frontal hematoma and superior scalp abrasion, cerumen bilaterally obscures TMs, no mariee sign or raccoon eyes, no epistaxis or septal hematoma or signs of facial trauma, no facial bone tenderness  EYES: no scleral icterus, PERRLA, extraocular movements intact  CV: regular rhythm, regular rate  RESPIRATORY: normal effort CTA B  ABDOMEN: soft, nontender  MUSCULOSKELETAL: no deformity, age-appropriate range of motion, there is diffuse tenderness to the C-spine, no T or L-spine tenderness  NEURO: alert, moves all extremities, follows commands  SKIN: warm, dry    Vital signs and nursing notes reviewed.          LAB RESULTS  Recent Results (from the past 24 hour(s))   Comprehensive Metabolic Panel    Collection Time: 07/06/20  1:22 PM   Result Value Ref Range    Glucose 126 (H) 65 - 99 mg/dL    BUN 11 8 - 23 mg/dL    Creatinine 1.39 (H) 0.76 - 1.27 mg/dL    Sodium 134 (L) 136 - 145 mmol/L    Potassium 5.4 (H) 3.5 - 5.2 mmol/L    Chloride 102 98 - 107 mmol/L    CO2 27.7 22.0 - 29.0 mmol/L    Calcium 10.0 8.6 - 10.5 mg/dL    Total Protein 6.7 6.0 - 8.5 g/dL    Albumin 3.70 3.50 - 5.20 g/dL    ALT (SGPT) 62 (H) 1 - 41 U/L    AST (SGOT) 47 (H) 1 - 40 U/L    Alkaline Phosphatase 171 (H) 39 - 117 U/L    Total Bilirubin 0.8 0.2 - 1.2 mg/dL    eGFR Non African Amer 48 (L) >60 mL/min/1.73    Globulin 3.0 gm/dL    A/G Ratio 1.2 g/dL    BUN/Creatinine Ratio 7.9 7.0 - 25.0    Anion Gap 4.3 (L) 5.0 - 15.0 mmol/L   Troponin    Collection Time: 07/06/20  1:22 PM   Result Value Ref Range    Troponin T <0.010 0.000 - 0.030 ng/mL   CBC Auto Differential    Collection Time: 07/06/20  1:22 PM   Result Value Ref Range    WBC 11.10 (H) 3.40 - 10.80 10*3/mm3    RBC 4.38 4.14 - 5.80 10*6/mm3    Hemoglobin 13.7 13.0 - 17.7 g/dL    Hematocrit 41.0 37.5 - 51.0 %    MCV 93.6 79.0 - 97.0 fL    MCH 31.3 26.6 - 33.0 pg    MCHC 33.4 31.5 - 35.7 g/dL    RDW 13.2 12.3 - 15.4 %    RDW-SD 46.0 37.0 - 54.0  fl    MPV 9.2 6.0 - 12.0 fL    Platelets 154 140 - 450 10*3/mm3    Neutrophil % 69.6 42.7 - 76.0 %    Lymphocyte % 17.4 (L) 19.6 - 45.3 %    Monocyte % 8.1 5.0 - 12.0 %    Eosinophil % 3.9 0.3 - 6.2 %    Basophil % 0.5 0.0 - 1.5 %    Immature Grans % 0.5 0.0 - 0.5 %    Neutrophils, Absolute 7.73 (H) 1.70 - 7.00 10*3/mm3    Lymphocytes, Absolute 1.93 0.70 - 3.10 10*3/mm3    Monocytes, Absolute 0.90 0.10 - 0.90 10*3/mm3    Eosinophils, Absolute 0.43 (H) 0.00 - 0.40 10*3/mm3    Basophils, Absolute 0.05 0.00 - 0.20 10*3/mm3    Immature Grans, Absolute 0.06 (H) 0.00 - 0.05 10*3/mm3    nRBC 0.0 0.0 - 0.2 /100 WBC   Urinalysis With Microscopic If Indicated (No Culture) - Urine, Clean Catch    Collection Time: 07/06/20  2:05 PM   Result Value Ref Range    Color, UA Yellow Yellow, Straw    Appearance, UA Clear Clear    pH, UA 5.5 5.0 - 8.0    Specific Gravity, UA 1.014 1.005 - 1.030    Glucose, UA Negative Negative    Ketones, UA Negative Negative    Bilirubin, UA Negative Negative    Blood, UA Negative Negative    Protein, UA Trace (A) Negative    Leuk Esterase, UA Negative Negative    Nitrite, UA Negative Negative    Urobilinogen, UA 0.2 E.U./dL 0.2 - 1.0 E.U./dL       Ordered the above labs and independently reviewed the results.        RADIOLOGY  CT Head Without Contrast   Preliminary Result   1. There is moderate to severe small vessel disease in the cerebral   white matter and diffuse cerebral atrophy and the lateral and third   ventricles are prominent size felt to be due to central volume loss or   atrophy.   2. Very large scalp hematoma over the anterolateral left frontal bone   extending to the left supraorbital scalp from today's head trauma. The   remainder of the head CT is within normal limits with no acute skull   fracture or intracranial hemorrhage identified.       CERVICAL SPINE CT:   TECHNIQUE: Spiral CT images were obtained from the skull base down to   the T2-3 thoracic level and the images were  reformatted and submitted in   2 mm thick axial, sagittal and coronal CT sections with soft tissue   algorithm and 1 mm thick axial sagittal and coronal CT sections with   high-resolution bone algorithm.       COMPARISON: This is correlated to a prior cervical spine CT from [facility] on 03/28/2020.       FINDINGS: The atlantooccipital articulation is normal in appearance.   There is an acute hairline nondisplaced fracture of the right posterior   ring of C1. Furthermore, there is a hairline nondisplaced fracture   through the base of the odontoid process compatible with a hairline   nondisplaced type II odontoid fracture. There are severe arthritic   changes at the atlantodental interval. There is some thickening and some   linear calcification along the margin with a thickened transverse   ligament.       At C2-3, there is mild bilateral facet overgrowth, mild disc space   narrowing, degenerative endplate changes, minimal posterior endplate   spurring. There is some right-sided uncovertebral joint spurring and   there is mild right bony foraminal narrowing. No central canal or left   foraminal narrowing.       At C3-4, there is a mild diffuse posterior disc osteophyte complex that   abuts and mildly deforms the ventral surface of the cord, mild to   moderately narrowing the canal. There is moderate bilateral facet   overgrowth. There is some bilateral uncovertebral joint hypertrophy.   There is mild-to-moderate left and moderate right bony foraminal   narrowing.       At C4-5, there is moderate bilateral facet overgrowth, disc space   narrowing, degenerative endplate changes, posterior endplate spurring,   some uncovertebral joint hypertrophy, mild left and mild-to-moderate   right bony foraminal narrowing.       At C5-6, there is mild right and moderate left facet overgrowth, 1-2 mm   anterolisthesis of C5 on C6. There is only minimal canal narrowing.   There is mild-to-moderate right bony  foraminal narrowing.       At C6-7, there is mild-to-moderate bilateral facet overgrowth. There is   a 3 mm degenerative anterolisthesis of C6 on C7. There is mild canal and   bilateral foraminal narrowing.       At C7-T1, there is moderate bilateral facet overgrowth, 2 mm   anterolisthesis of C7 on T1. There is no canal narrowing. There is mild   bilateral bony foraminal narrowing.       IMPRESSION:   1. Since prior cervical spine CT 03/28/2020, the patient has had head   trauma today and has an acute nondisplaced fracture of the right   posterolateral ring of C1 and has an additional hairline acute   nondisplaced fracture through the base of the odontoid process   compatible with a nondisplaced acute type II odontoid process fracture   which is an unstable fracture and neurosurgical consultation is   warranted.   2. The remainder of the cervical spine CT is unchanged with diffuse   cervical spondylosis as described in great detail above.       The results of this study were communicated to Allie Acevedo PA-C, the   physician assistant in the emergency room taking care of the patient, by   telephone 07/06/2020 at 1:30 PM.        Radiation dose reduction techniques were utilized, including automated   exposure control and exposure modulation based on body size.              CT Cervical Spine Without Contrast   Preliminary Result   1. There is moderate to severe small vessel disease in the cerebral   white matter and diffuse cerebral atrophy and the lateral and third   ventricles are prominent size felt to be due to central volume loss or   atrophy.   2. Very large scalp hematoma over the anterolateral left frontal bone   extending to the left supraorbital scalp from today's head trauma. The   remainder of the head CT is within normal limits with no acute skull   fracture or intracranial hemorrhage identified.       CERVICAL SPINE CT:   TECHNIQUE: Spiral CT images were obtained from the skull base down to   the  T2-3 thoracic level and the images were reformatted and submitted in   2 mm thick axial, sagittal and coronal CT sections with soft tissue   algorithm and 1 mm thick axial sagittal and coronal CT sections with   high-resolution bone algorithm.       COMPARISON: This is correlated to a prior cervical spine CT from [facility] on 03/28/2020.       FINDINGS: The atlantooccipital articulation is normal in appearance.   There is an acute hairline nondisplaced fracture of the right posterior   ring of C1. Furthermore, there is a hairline nondisplaced fracture   through the base of the odontoid process compatible with a hairline   nondisplaced type II odontoid fracture. There are severe arthritic   changes at the atlantodental interval. There is some thickening and some   linear calcification along the margin with a thickened transverse   ligament.       At C2-3, there is mild bilateral facet overgrowth, mild disc space   narrowing, degenerative endplate changes, minimal posterior endplate   spurring. There is some right-sided uncovertebral joint spurring and   there is mild right bony foraminal narrowing. No central canal or left   foraminal narrowing.       At C3-4, there is a mild diffuse posterior disc osteophyte complex that   abuts and mildly deforms the ventral surface of the cord, mild to   moderately narrowing the canal. There is moderate bilateral facet   overgrowth. There is some bilateral uncovertebral joint hypertrophy.   There is mild-to-moderate left and moderate right bony foraminal   narrowing.       At C4-5, there is moderate bilateral facet overgrowth, disc space   narrowing, degenerative endplate changes, posterior endplate spurring,   some uncovertebral joint hypertrophy, mild left and mild-to-moderate   right bony foraminal narrowing.       At C5-6, there is mild right and moderate left facet overgrowth, 1-2 mm   anterolisthesis of C5 on C6. There is only minimal canal narrowing.    There is mild-to-moderate right bony foraminal narrowing.       At C6-7, there is mild-to-moderate bilateral facet overgrowth. There is   a 3 mm degenerative anterolisthesis of C6 on C7. There is mild canal and   bilateral foraminal narrowing.       At C7-T1, there is moderate bilateral facet overgrowth, 2 mm   anterolisthesis of C7 on T1. There is no canal narrowing. There is mild   bilateral bony foraminal narrowing.       IMPRESSION:   1. Since prior cervical spine CT 03/28/2020, the patient has had head   trauma today and has an acute nondisplaced fracture of the right   posterolateral ring of C1 and has an additional hairline acute   nondisplaced fracture through the base of the odontoid process   compatible with a nondisplaced acute type II odontoid process fracture   which is an unstable fracture and neurosurgical consultation is   warranted.   2. The remainder of the cervical spine CT is unchanged with diffuse   cervical spondylosis as described in great detail above.       The results of this study were communicated to Allie Acevedo PA-C, the   physician assistant in the emergency room taking care of the patient, by   telephone 07/06/2020 at 1:30 PM.        Radiation dose reduction techniques were utilized, including automated   exposure control and exposure modulation based on body size.              XR Chest 1 View    (Results Pending)       I ordered the above noted radiological studies. Reviewed by me and discussed with radiologist.  See dictation for official radiology interpretation.    PROCEDURES  Critical Care  Performed by: Allie Acevedo PA  Authorized by: Chava Zhao MD     Critical care provider statement:     Critical care time was exclusive of:  Separately billable procedures and treating other patients and teaching time    Critical care was necessary to treat or prevent imminent or life-threatening deterioration of the following conditions:  Cardiac failure and metabolic crisis     Critical care was time spent personally by me on the following activities:  Development of treatment plan with patient or surrogate, discussions with consultants, evaluation of patient's response to treatment, examination of patient, obtaining history from patient or surrogate, ordering and performing treatments and interventions, ordering and review of laboratory studies, ordering and review of radiographic studies, pulse oximetry, re-evaluation of patient's condition and review of old charts            MEDICATIONS GIVEN IN ER  Medications   sodium chloride 0.9 % infusion (has no administration in time range)   calcium gluconate 1 g/100 mL (10 mg/mL) NS IVPB (VTB) (has no administration in time range)   insulin regular (humuLIN R,novoLIN R) injection 10 Units (has no administration in time range)   dextrose (D50W) 25 g/ 50mL Intravenous Solution 50 mL (50 mL Intravenous Given 7/6/20 1531)   morphine injection 2 mg (2 mg Intravenous Given 7/6/20 1528)             PROGRESS AND CONSULTS    DDX includes but not limited to skull fracture, brain bleed, cervical strain, cervical fracture    ED Course as of Jul 06 1535   Mon Jul 06, 2020   1332 I discussed the patient with Dr. Cisneros, radiologist. He states large left frontal hematoma and no acute intracranial findings on Head CT. C-spine CT shows acute fracture of the Right post lat ring of c1  There is a second fracture that is Horizontal oblique through the base of the odontoid, unstable    [KA]   1335 Call out to neurosurgery    [KA]   1349 I discussed the patient with AJ Echevarria with neurosurgery.  Patient is already in an [place] collar.  She recommends admission to the hospitalist.  Patient has a pacemaker and therefore cannot undergo MRI.  Wife states it is a nonfunctioning pacemaker.    [KA]   1354 Discussed the patient presentation and imaging with Dr. Kay of [facility] who agrees to admit to telemetry.    [KA]   1411 Troponin T: <0.010 [KA]   1411 Baseline      Creatinine(!): 1.39 [KA]   1411 Elevated potassium, will give hyperkalemia meds   Potassium(!): 5.4 [KA]   1535 ER visit on 3/28/2020 due to head injury from mechanical fall.  Nose laceration repaired.  CT head and C-spine are unremarkable.    Last Tdap given on 10/17/2017.        [KA]      ED Course User Index  [KA] Allie Acevedo PA        Reviewed pt's history and workup with Dr. Zhao.  After a bedside evaluation; they agree with the plan of care      Patient was placed in face mask in first look. Patient was wearing facemask each time I entered the room and throughout our encounter. I wore  protective equipment throughout this patient encounter including a face mask, eye shield and gloves. Hand hygiene was performed before donning protective equipment and after removal when leaving the room.        DIAGNOSIS  Final diagnoses:   Closed odontoid fracture, initial encounter (CMS/[facility])   Closed nondisplaced fracture of first cervical vertebra, unspecified fracture morphology, initial encounter (CMS/[facility])   Contusion of scalp, initial encounter   Hyperkalemia               Latest Documented Vital Signs:  As of 15:33  BP- 177/90 HR- 73 Temp- 98.8 °F (37.1 °C) (Tympanic) O2 sat- 97%       Allie Acevedo PA  07/06/20 1530

## 2020-07-06 NOTE — ED PROVIDER NOTES
MD ATTESTATION NOTE    The PAOLA and I have discussed this patient's history, physical exam, and treatment plan.  I have reviewed the documentation and personally had a face to face interaction with the patient. I affirm the documentation and agree with the treatment and plan.  The attached note describes my personal findings.    Pleasant elderly gentleman who presents after a fall tonight at home.  He hit his forehead and does not remember if he had loss of consciousness.  Further he does not even really remember what happened.    He does have a large hematoma to the left side of his forehead, he is got some cervical spine tenderness without step-off.  He appears little confused although his wife says it is kind of his baseline.    CT scan shows no acute intracranial hemorrhage, but he does have 2 cervical spine fractures.  He is neurologically intact and has been seen by neurosurgery in the ED.  He has been placed in a hard collar and he will be admitted for further     Chava Zhao MD  07/06/20 [id number]

## 2020-07-06 NOTE — PROGRESS NOTES
Clinical Pharmacy Services: Medication History    Tyrone Kraft is a 86 y.o. male presenting to [facility] for   Chief Complaint   Patient presents with   • Dizziness   • Head Injury       He  has a past medical history of BPPV (benign paroxysmal positional vertigo) (02/03/2015), Chronic kidney disease, Chronic kidney disease (02/03/2015), Chronic kidney disease (08/13/2015), Coronary arteriosclerosis, Dementia without behavioral disturbance (CMS/HCC) (08/13/2015), Enlarged prostate without lower urinary tract symptoms (luts), Gastroparesis, GERD (gastroesophageal reflux disease), Hypercalcemia (02/03/2015), Hyperlipidemia (08/13/2015), Hypertension (08/13/2015), Ischemic cardiomyopathy, MGUS (monoclonal gammopathy of unknown significance), Nonsustained ventricular tachycardia (CMS/[facility]), and KELLY (obstructive sleep apnea).    Allergies as of 07/06/2020   • (No Known Allergies)       Medication information was obtained from: patient and pharmacy  Pharmacy and Phone Number:   [facility] - [place], [place] - [address] [place] [address] - [phone]  - [phone] FX  [address] [address]  Phone: [phone] Fax: [phone]    [id number] - [place], [place] - [facility] [facility] AT [facility] - [phone] PH - [phone] FX  [address] [facility] [address]  Phone: [phone] Fax: [phone]    [facility] - [place], [place] - [address] [facility] - [phone] PH - [phone] FX  [address] [facility] [address]  Phone: [phone] Fax: [phone]    [facility] - [place]  [address] [facility] [address]  Phone: [phone] Fax: [phone]        Prior to Admission Medications     Prescriptions Last Dose Informant Patient Reported? Taking?    acebutolol (SECTRAL) 200 MG capsule 7/5/2020 Pharmacy Yes Yes    Take 200 mg by mouth Daily.    ARIPiprazole (ABILIFY) 2 MG  tablet 7/5/2020 Spouse/Significant Other Yes Yes    Take 4 mg by mouth Daily.    aspirin 81 MG tablet 7/5/2020 Spouse/Significant Other Yes Yes    Take 81 mg by mouth Daily.    cycloSPORINE (RESTASIS) 0.05 % ophthalmic emulsion 7/5/2020 Spouse/Significant Other Yes Yes    Administer 1 drop to both eyes 2 (Two) Times a Day.    esomeprazole (NexIUM) 40 MG capsule 7/5/2020 Spouse/Significant Other Yes Yes    Take 1 capsule by mouth daily.    Glucosamine 500 MG capsule 7/5/2020 Spouse/Significant Other Yes Yes    Take 1 capsule by mouth Daily.    levothyroxine (SYNTHROID, LEVOTHROID) 50 MCG tablet 7/6/2020 Pharmacy Yes Yes    Take 50 mcg by mouth Daily.    Loratadine 10 MG capsule 7/5/2020 Spouse/Significant Other Yes Yes    Take 1 tablet by mouth Daily.    melatonin 5 MG tablet tablet 7/5/2020 Spouse/Significant Other Yes Yes    Take 10 mg by mouth Every Night.    memantine (NAMENDA) 10 MG tablet 7/5/2020 Spouse/Significant Other Yes Yes    Take 20 mg by mouth Daily.    Multiple Vitamins-Minerals (MULTIVITAMIN ADULTS PO) 7/5/2020 Spouse/Significant Other Yes Yes    Take 1 tablet by mouth Daily.    Omega-3 Fatty Acids (FISH OIL) 1000 MG capsule capsule 7/5/2020 Spouse/Significant Other Yes Yes    Take 1,000 mg by mouth Daily With Breakfast.    polycarbophil (FIBERCON) 625 MG tablet 7/5/2020 Spouse/Significant Other Yes Yes    Take 1,350 mg by mouth 2 (two) times a day.    rivastigmine (EXELON) 6 MG capsule 7/5/2020 Pharmacy Yes Yes    Take 1 capsule by mouth 2 (two) times a day.    tamsulosin (FLOMAX) 0.4 MG capsule 24 hr capsule 7/5/2020 Pharmacy No Yes    Take 1 capsule by mouth Daily.    Patient taking differently:  Take 1 capsule by mouth Every Night.    traZODone (DESYREL) 50 MG tablet 7/5/2020 Pharmacy Yes Yes    Take 1 tablet by mouth nightly.    venlafaxine XR (EFFEXOR-XR) 37.5 MG 24 hr capsule 7/5/2020 Pharmacy Yes Yes    Take 37.5 mg by mouth Daily.            Medication notes: [facility], Harvinder had nothing recent.    This medication list is complete to the best of my knowledge as of 7/6/2020    Please call if questions.    [facility]  Medication History Technician  [phone]    7/6/2020 15:37

## 2020-07-07 ENCOUNTER — APPOINTMENT (OUTPATIENT)
Dept: CT IMAGING | Facility: HOSPITAL | Age: 85
End: 2020-07-07

## 2020-07-07 ENCOUNTER — APPOINTMENT (OUTPATIENT)
Dept: GENERAL RADIOLOGY | Facility: HOSPITAL | Age: 85
End: 2020-07-07

## 2020-07-07 DIAGNOSIS — I60.9 SAH (SUBARACHNOID HEMORRHAGE) (HCC): ICD-10-CM

## 2020-07-07 DIAGNOSIS — S09.90XD TRAUMATIC INJURY OF HEAD, SUBSEQUENT ENCOUNTER: ICD-10-CM

## 2020-07-07 DIAGNOSIS — S12.100S CLOSED ODONTOID FRACTURE, SEQUELA: Primary | ICD-10-CM

## 2020-07-07 PROBLEM — S12.000A C1 CERVICAL FRACTURE (HCC): Status: ACTIVE | Noted: 2020-07-07

## 2020-07-07 LAB
ALBUMIN SERPL-MCNC: 3.3 G/DL (ref 3.5–5.2)
ALBUMIN/GLOB SERPL: 1 G/DL
ALP SERPL-CCNC: 166 U/L (ref 39–117)
ALT SERPL W P-5'-P-CCNC: 54 U/L (ref 1–41)
ANION GAP SERPL CALCULATED.3IONS-SCNC: 9.2 MMOL/L (ref 5–15)
AST SERPL-CCNC: 43 U/L (ref 1–40)
BASOPHILS # BLD AUTO: 0.06 10*3/MM3 (ref 0–0.2)
BASOPHILS NFR BLD AUTO: 0.4 % (ref 0–1.5)
BILIRUB SERPL-MCNC: 1.3 MG/DL (ref 0–1.2)
BUN SERPL-MCNC: 13 MG/DL (ref 8–23)
BUN/CREAT SERPL: 10.5 (ref 7–25)
CALCIUM SPEC-SCNC: 9.8 MG/DL (ref 8.6–10.5)
CHLORIDE SERPL-SCNC: 102 MMOL/L (ref 98–107)
CO2 SERPL-SCNC: 23.8 MMOL/L (ref 22–29)
CREAT SERPL-MCNC: 1.24 MG/DL (ref 0.76–1.27)
DEPRECATED RDW RBC AUTO: 44.4 FL (ref 37–54)
EOSINOPHIL # BLD AUTO: 0.1 10*3/MM3 (ref 0–0.4)
EOSINOPHIL NFR BLD AUTO: 0.6 % (ref 0.3–6.2)
ERYTHROCYTE [DISTWIDTH] IN BLOOD BY AUTOMATED COUNT: 13.1 % (ref 12.3–15.4)
GFR SERPL CREATININE-BSD FRML MDRD: 55 ML/MIN/1.73
GLOBULIN UR ELPH-MCNC: 3.4 GM/DL
GLUCOSE SERPL-MCNC: 115 MG/DL (ref 65–99)
HCT VFR BLD AUTO: 42.5 % (ref 37.5–51)
HGB BLD-MCNC: 14.6 G/DL (ref 13–17.7)
IMM GRANULOCYTES # BLD AUTO: 0.1 10*3/MM3 (ref 0–0.05)
IMM GRANULOCYTES NFR BLD AUTO: 0.6 % (ref 0–0.5)
LYMPHOCYTES # BLD AUTO: 2.34 10*3/MM3 (ref 0.7–3.1)
LYMPHOCYTES NFR BLD AUTO: 14.2 % (ref 19.6–45.3)
MCH RBC QN AUTO: 31.4 PG (ref 26.6–33)
MCHC RBC AUTO-ENTMCNC: 34.4 G/DL (ref 31.5–35.7)
MCV RBC AUTO: 91.4 FL (ref 79–97)
MONOCYTES # BLD AUTO: 1.31 10*3/MM3 (ref 0.1–0.9)
MONOCYTES NFR BLD AUTO: 7.9 % (ref 5–12)
NEUTROPHILS NFR BLD AUTO: 12.61 10*3/MM3 (ref 1.7–7)
NEUTROPHILS NFR BLD AUTO: 76.3 % (ref 42.7–76)
NRBC BLD AUTO-RTO: 0 /100 WBC (ref 0–0.2)
PLATELET # BLD AUTO: 200 10*3/MM3 (ref 140–450)
PMV BLD AUTO: 9.1 FL (ref 6–12)
POTASSIUM SERPL-SCNC: 4.7 MMOL/L (ref 3.5–5.2)
PROT SERPL-MCNC: 6.7 G/DL (ref 6–8.5)
RBC # BLD AUTO: 4.65 10*6/MM3 (ref 4.14–5.8)
SODIUM SERPL-SCNC: 135 MMOL/L (ref 136–145)
WBC # BLD AUTO: 16.52 10*3/MM3 (ref 3.4–10.8)

## 2020-07-07 PROCEDURE — G0378 HOSPITAL OBSERVATION PER HR: HCPCS

## 2020-07-07 PROCEDURE — 80053 COMPREHEN METABOLIC PANEL: CPT | Performed by: INTERNAL MEDICINE

## 2020-07-07 PROCEDURE — 72050 X-RAY EXAM NECK SPINE 4/5VWS: CPT

## 2020-07-07 PROCEDURE — 92610 EVALUATE SWALLOWING FUNCTION: CPT

## 2020-07-07 PROCEDURE — 99213 OFFICE O/P EST LOW 20 MIN: CPT | Performed by: NURSE PRACTITIONER

## 2020-07-07 PROCEDURE — 96361 HYDRATE IV INFUSION ADD-ON: CPT

## 2020-07-07 PROCEDURE — 96376 TX/PRO/DX INJ SAME DRUG ADON: CPT

## 2020-07-07 PROCEDURE — 85025 COMPLETE CBC W/AUTO DIFF WBC: CPT | Performed by: INTERNAL MEDICINE

## 2020-07-07 PROCEDURE — 25010000002 MORPHINE PER 10 MG: Performed by: NURSE PRACTITIONER

## 2020-07-07 PROCEDURE — 70450 CT HEAD/BRAIN W/O DYE: CPT

## 2020-07-07 RX ORDER — MEMANTINE HYDROCHLORIDE 10 MG/1
20 TABLET ORAL DAILY
Status: DISCONTINUED | OUTPATIENT
Start: 2020-07-07 | End: 2020-07-08 | Stop reason: HOSPADM

## 2020-07-07 RX ORDER — RIVASTIGMINE TARTRATE 1.5 MG/1
6 CAPSULE ORAL 2 TIMES DAILY WITH MEALS
Status: DISCONTINUED | OUTPATIENT
Start: 2020-07-07 | End: 2020-07-08 | Stop reason: HOSPADM

## 2020-07-07 RX ORDER — CYCLOSPORINE 0.5 MG/ML
1 EMULSION OPHTHALMIC 2 TIMES DAILY
Status: DISCONTINUED | OUTPATIENT
Start: 2020-07-07 | End: 2020-07-08 | Stop reason: HOSPADM

## 2020-07-07 RX ORDER — ACEBUTOLOL HYDROCHLORIDE 200 MG/1
200 CAPSULE ORAL
Status: DISCONTINUED | OUTPATIENT
Start: 2020-07-07 | End: 2020-07-08 | Stop reason: HOSPADM

## 2020-07-07 RX ORDER — PANTOPRAZOLE SODIUM 40 MG/1
40 TABLET, DELAYED RELEASE ORAL EVERY MORNING
Status: DISCONTINUED | OUTPATIENT
Start: 2020-07-07 | End: 2020-07-08 | Stop reason: HOSPADM

## 2020-07-07 RX ORDER — ARIPIPRAZOLE 2 MG/1
4 TABLET ORAL DAILY
Status: DISCONTINUED | OUTPATIENT
Start: 2020-07-07 | End: 2020-07-08 | Stop reason: HOSPADM

## 2020-07-07 RX ORDER — LEVOTHYROXINE SODIUM 0.05 MG/1
50 TABLET ORAL DAILY
Status: DISCONTINUED | OUTPATIENT
Start: 2020-07-07 | End: 2020-07-08 | Stop reason: HOSPADM

## 2020-07-07 RX ORDER — VENLAFAXINE HYDROCHLORIDE 37.5 MG/1
37.5 CAPSULE, EXTENDED RELEASE ORAL DAILY
Status: DISCONTINUED | OUTPATIENT
Start: 2020-07-07 | End: 2020-07-08 | Stop reason: HOSPADM

## 2020-07-07 RX ORDER — TAMSULOSIN HYDROCHLORIDE 0.4 MG/1
0.4 CAPSULE ORAL NIGHTLY
Status: DISCONTINUED | OUTPATIENT
Start: 2020-07-07 | End: 2020-07-08 | Stop reason: HOSPADM

## 2020-07-07 RX ADMIN — LEVOTHYROXINE SODIUM 50 MCG: 50 TABLET ORAL at 12:19

## 2020-07-07 RX ADMIN — RIVASTIGMINE TARTRATE 6 MG: 1.5 CAPSULE ORAL at 12:19

## 2020-07-07 RX ADMIN — PANTOPRAZOLE SODIUM 40 MG: 40 TABLET, DELAYED RELEASE ORAL at 12:18

## 2020-07-07 RX ADMIN — RIVASTIGMINE TARTRATE 6 MG: 1.5 CAPSULE ORAL at 18:20

## 2020-07-07 RX ADMIN — MORPHINE SULFATE 2 MG: 2 INJECTION, SOLUTION INTRAMUSCULAR; INTRAVENOUS at 19:30

## 2020-07-07 RX ADMIN — CYCLOSPORINE 1 DROP: 0.5 EMULSION OPHTHALMIC at 20:27

## 2020-07-07 RX ADMIN — ACEBUTOLOL HYDROCHLORIDE 200 MG: 200 CAPSULE ORAL at 14:32

## 2020-07-07 RX ADMIN — SODIUM CHLORIDE 100 ML/HR: 9 INJECTION, SOLUTION INTRAVENOUS at 20:27

## 2020-07-07 RX ADMIN — TAMSULOSIN HYDROCHLORIDE 0.4 MG: 0.4 CAPSULE ORAL at 20:27

## 2020-07-07 RX ADMIN — SODIUM CHLORIDE, PRESERVATIVE FREE 10 ML: 5 INJECTION INTRAVENOUS at 12:19

## 2020-07-07 RX ADMIN — MORPHINE SULFATE 2 MG: 2 INJECTION, SOLUTION INTRAMUSCULAR; INTRAVENOUS at 10:16

## 2020-07-07 RX ADMIN — VENLAFAXINE HYDROCHLORIDE 37.5 MG: 37.5 CAPSULE, EXTENDED RELEASE ORAL at 12:20

## 2020-07-07 RX ADMIN — CYCLOSPORINE 1 DROP: 0.5 EMULSION OPHTHALMIC at 12:19

## 2020-07-07 RX ADMIN — SODIUM CHLORIDE 100 ML/HR: 9 INJECTION, SOLUTION INTRAVENOUS at 03:47

## 2020-07-07 RX ADMIN — ARIPIPRAZOLE 4 MG: 2 TABLET ORAL at 12:20

## 2020-07-07 RX ADMIN — MEMANTINE HYDROCHLORIDE 20 MG: 10 TABLET, FILM COATED ORAL at 12:20

## 2020-07-07 NOTE — PLAN OF CARE
Pt reported pain of 8 for neck pain.Pt NPO. One time order received for Morphine IV. See MAR which was effective. Pt rested well this shift. Mental status at baseline.

## 2020-07-07 NOTE — PROGRESS NOTES
LOS: 1 day   Patient Care Team:  Kinga Peguero APRN as PCP - General (Family Medicine)    Chief Complaint: Neck pain following fall; C1, C2 fracture    Subjective     History of Present Illness     Reports some lessening in the cervical pain. Wearing ASPEN collar.     History taken from: patient chart family    Objective     Vital Signs  Temp:  [97.5 °F (36.4 °C)-98.5 °F (36.9 °C)] 98.5 °F (36.9 °C)  Heart Rate:  [] 107  Resp:  [16-18] 16  BP: (144-184)/() 144/113      AA&O x 3.   L frontal scalp contusion/hematoma persists.  Ecchymosis noted to L forehead and both orbital regions L>R.  PERRL. EOM's intact.   Face symmetric. Wearing his ASPEN collar; fits well.   Moving all four extremities without difficulty.   No long tract signs.       Results Review:     I reviewed the patient's new clinical results.  I reviewed the patient's new imaging results and agree with the interpretation.    CT HEAD WITHOUT CONTRAST 7/7/2020    Large L frontal scalp hemaoma noted with extension into the L parietal and temporal regions. Small, 1.5 mm area of increased attenuation overlying the L insular cortex. Etiology of this finding uncertain. It may be artifactual or possibly a small area of traumatic SAH. Possible intra-arterial thrombus was also mentioned as a distant possibility by the reading radiologist.     These CT results and the images were reviewed by Dr. Pennington as well.          Assessment/Plan       C1 cervical fracture (CMS/HCC)    Chronic kidney disease (CKD), stage III (moderate) (CMS/HCC)    HLD (hyperlipidemia)    Acquired hypothyroidism    Dementia without behavioral disturbance (CMS/HCC)    Odontoid fracture (CMS/HCC)    Hyperkalemia    Hyponatremia    Elevated LFTs    Hematoma of frontal scalp      Assessment & Plan     I have spoken to Dr. Pennington about the head CT results.     Dr. Pennington does not feel that any further imaging needs to be considered right now but we will arrange a  f/u in our office with CT head and repeat cervical spine    X-rays in 10 - 14 days - appointment for head CT is July 23, 2020 at 11:15 am. He will then proceed to our office from there for the appointment.     Patient informed that he must continue wearing the cervical collar at all times except to shower.     PT has been asked to evaluate. Cleared for d/c home as long as he is walking okay.     Rhina Barrett, AJ  07/07/20  14:36

## 2020-07-07 NOTE — PLAN OF CARE
Pt alert and oriented to self and date w/episodes of confusion which is patient's baseline. Resp even and unlabored. Pain medication admin once this shift and was effective. Large hematoma to left head, eye and face from fall. Pt NPO r/t failed swallow test. Swallow study for today. Spouse w/patient per risk mgmt authorization. Pt rested well this shift. No nausea or vomiting. No increase headache. VS WNL will cont to monitor

## 2020-07-07 NOTE — PROGRESS NOTES
"Adult Nutrition  Assessment/PES    Patient Name:  Tyrone Kraft  YOB: 1934  MRN: [id number]  Admit Date:  7/6/2020    Assessment Date:  7/7/2020    Comments:  Nutrition assessment triggered by MST score of 3 per nurse admission screen.  S/p fall, C2 and odontoid fracture.  Large hematoma to L head, eye, face from fall per chart review.  Patient alert and oriented to self and date with episodes of confusion, which is baseline for him.    Patient currently NPO.  Plans for SLP to evaluate patient.  Weight appears fairly stable per chart weight history.    RD to continue to follow as diet is advanced.    Reason for Assessment     Row Name 07/07/20 0914          Reason for Assessment    Reason For Assessment  identified at risk by screening criteria     Diagnosis  other (see comments);renal disease;neurologic conditions;gastrointestinal disease;cardiac disease;pulmonary disease vertigo, CKD, dementia, gastroparesis, GERD, HLD, HTN, CMY, KELLY, CAD, CABG, AICD (dead battery); adm s/p fall, C2 and odontoid fracture     Identified At Risk by Screening Criteria  MST SCORE 2+;difficulty chewing/swallowing         Nutrition/Diet History     Row Name 07/07/20 0918          Nutrition/Diet History    Typical Food/Fluid Intake  NPO, awaiting SLP evaluation         Anthropometrics     Row Name 07/07/20 0918 07/07/20 0649       Anthropometrics    Height  175.3 cm (69.02\")  --    Weight  --  76.1 kg (167 lb 11.2 oz)       Admit Weight    Admit Weight  -- 167# 7/7  --       Ideal Body Weight (IBW)    Ideal Body Weight (IBW) (kg)  73.73  --       Body Mass Index (BMI)    BMI Assessment  BMI 18.5-24.9: normal 24.72  --        Labs/Tests/Procedures/Meds     Row Name 07/07/20 0919          Labs/Procedures/Meds    Lab Results Reviewed  reviewed, pertinent     Lab Results Comments  Gluc, Na, GFR, AlkP, ALT, AST, Alb, WBC        Diagnostic Tests/Procedures    Diagnostic Test/Procedure Reviewed  reviewed, pertinent     " "Diagnostic Test/Procedures Comments  needs SLP evaluation today        Medications    Pertinent Medications Reviewed  reviewed, pertinent     Pertinent Medications Comments  synthroid, protonix, IVFs         Physical Findings     Row Name 07/07/20 0919          Physical Findings    Skin  other (see comments) B=15, scab, large hematoma to L head, eye, face from fall         Estimated/Assessed Needs     Row Name 07/07/20 0929 07/07/20 0918       Calculation Measurements    Weight Used For Calculations  76.1 kg (167 lb 12.3 oz)  --    Height  --  175.3 cm (69.02\")       Estimated/Assessed Needs       KCAL/KG    KCAL/KG  20 Kcal/Kg (kcal);25 Kcal/Kg (kcal)  --    20 Kcal/Kg (kcal)  1522  --    25 Kcal/Kg (kcal)  1902.5  --       Protein Requirements    Weight Used For Protein Calculations  76.1 kg (167 lb 12.3 oz)  --    Est Protein Requirement Amount (gms/kg)  0.8 gm protein  --    Estimated Protein Requirements (gms/day)  60.88  --       Fluid Requirements    Estimated Fluid Requirements (mL/day)  1903  --        Nutrition Prescription Ordered     Row Name 07/07/20 0929          Nutrition Prescription PO    Current PO Diet  NPO         Problem/Interventions:  Problem 1     Row Name 07/07/20 0929          Nutrition Diagnoses Problem 1    Problem 1  Inadequate Intake/Infusion     Inadequate Intake Type  Oral     Macronutrient  Kcal;Protein     Etiology (related to)  MNT for Treatment/Condition     Signs/Symptoms (evidenced by)  NPO;SLP/Swallow eval     Swallow eval status  Pending     Type of SLP Evaluation  Bedside         Intervention Goal     Row Name 07/07/20 0929          Intervention Goal    General  Maintain nutrition;Reduce/improve symptoms;Disease management/therapy     PO  Initiate feeding     Weight  Maintain weight         Nutrition Intervention     Row Name 07/07/20 0929          Nutrition Intervention    RD/Tech Action  Follow Tx progress;Care plan reviewd;Await begin PO         Education/Evaluation     " Row Name 07/07/20 0830          Education    Education  Will Instruct as appropriate        Monitor/Evaluation    Monitor  Per protocol;I&O;Pertinent labs;Weight;Skin status;Symptoms     Education Follow-up  Reinforce PRN           Electronically signed by:  Tonie Desai RD  07/07/20 09:30

## 2020-07-07 NOTE — PLAN OF CARE
bedside swallow eval completed, pt wearing cervical collar. baseline dry throat clear noted-did not appear associated with PO. no overt s/s of aspiration with thin, puree, mech soft solids. mastication slow but functional for soft foods. REC: mech soft diet/thin liquids, meds with puree, safe swallow precautions. may upgrade solids as pts strength improves. SLP to monitor diet tolerance.

## 2020-07-07 NOTE — THERAPY EVALUATION
Acute Care - Speech Language Pathology   Swallow Initial Evaluation [facility]     Patient Name: Tyrone Kraft  : 1934  MRN: [id number]  Today's Date: 2020               Admit Date: 2020    Visit Dx:     ICD-10-CM ICD-9-CM   1. Closed odontoid fracture, initial encounter (CMS/[facility]) S12.100A 805.02   2. Closed nondisplaced fracture of first cervical vertebra, unspecified fracture morphology, initial encounter (CMS/[facility]) S12.001A 805.01   3. Contusion of scalp, initial encounter S00.03XA 920   4. Hyperkalemia E87.5 276.7     Patient Active Problem List   Diagnosis   • Benign paroxysmal positional nystagmus   • Chronic kidney failure   • Chronic kidney disease (CKD), stage III (moderate) (CMS/[facility])   • Enlarged prostate   • HLD (hyperlipidemia)   • MGUS (monoclonal gammopathy of unknown significance)   • Borderline diabetes   • Acquired hypothyroidism   • Elevated white blood cell count   • Perennial allergic rhinitis   • Dementia without behavioral disturbance (CMS/[facility])   • Odontoid fracture (CMS/[facility])   • Hyperkalemia   • Hyponatremia   • Elevated LFTs   • Hematoma of frontal scalp   • C1 cervical fracture (CMS/[facility])     Past Medical History:   Diagnosis Date   • BPPV (benign paroxysmal positional vertigo) 2015    I have put in a script for meclizine to take at night.  If he is not better in a week, will need to send to ENT for Eply maneuvers.   • Chronic kidney disease     Chronic. CR previously 1.6-2 but it was 1.4 in , f/b Dr. Goff   • Chronic kidney disease 2015    Stable. he sees Dr. Goff.  I will send his labs to [place].   • Chronic kidney disease 2015    Stable.  Cont to monitor q4-6 mo.   • Coronary arteriosclerosis     MI in  treated medically, had cath and then CABG in  in [place], had stents in  in [place].  Myoview 10/2014  MI but no ischemia.   • Dementia without behavioral disturbance (CMS/HCC) 2015    Managed by Psychiatrist. I have  encouraged his wife to be very vigilant with his driving and his reation time.  According to them, his psychiatrist is OK with him driving.   • Enlarged prostate without lower urinary tract symptoms (luts)    • Gastroparesis    • GERD (gastroesophageal reflux disease)    • Hypercalcemia 02/03/2015    Recheck labs in 3 months.   • Hyperlipidemia 08/13/2015    Very well controlled.  cont current meds.   • Hypertension 08/13/2015    Controlled. Cont current meds.   • Ischemic cardiomyopathy     Reported history, s/p singl lead Medtronic ICD 2006 in [place], no details available; repeat echo 2/2013 showed normal LV size and function, EF 55%   • MGUS (monoclonal gammopathy of unknown significance)    • Nonsustained ventricular tachycardia (CMS/HCC)     Noted incidentally on ECD interrogation   • KELLY (obstructive sleep apnea)      Past Surgical History:   Procedure Laterality Date   • ANGIOPLASTY      Cath Stent Placement   • CARDIAC DEFIBRILLATOR PLACEMENT      Medtronic singl lead, 2006, [facility]   • CORONARY ARTERY BYPASS GRAFT     • TOE SURGERY          SWALLOW EVALUATION (last 72 hours)      SLP Adult Swallow Evaluation     Row Name 07/07/20 1000                   Rehab Evaluation    Document Type  evaluation  -LT        Subjective Information  no complaints  -LT        Patient Observations  alert;cooperative in cervical collar  -LT        Patient/Family Observations  wife present  -LT        Patient Effort  adequate  -LT        Symptoms Noted During/After Treatment  none  -LT           General Information    Patient Profile Reviewed  yes  -LT        Pertinent History Of Current Problem  85 yo M w/c1 fracture s/p fall. Hx: CKD, CAD w/CABG, dementia. wife reports pt has baseline cough, on reg/thin at home.   -LT        Current Method of Nutrition  NPO  -LT        Prior Level of Function-Swallowing  no diet consistency restrictions  -LT        Plans/Goals Discussed with  patient;spouse/S.O.;agreed upon  -LT         Barriers to Rehab  none identified  -LT        Patient's Goals for Discharge  return to PO diet  -LT        Family Goals for Discharge  patient able to return to PO diet  -LT           Oral Motor and Function    Dentition Assessment  natural, present and adequate  -LT        Secretion Management  dried secretions in oral cavity  -LT        Mucosal Quality  dry  -LT        Volitional Swallow  weak  -LT        Volitional Cough  weak  -LT           Oral Musculature and Cranial Nerve Assessment    Oral Motor General Assessment  generalized oral motor weakness  -LT           Clinical Swallow Eval    Clinical Swallow Evaluation Summary  bedside swallow eval completed, pt wearing cervical collar. baseline dry throat clear noted-did not appear associated with PO. no overt s/s of aspiration with thin, puree, mech soft solids. mastication slow but functional for soft foods. REC: mech soft diet/thin liquids, meds with puree, safe swallow precautions. may upgrade solids as pts strength improves. SLP to monitor diet tolerance.    -LT           Clinical Impression    SLP Swallowing Diagnosis  functional pharyngeal phase  -LT        Functional Impact  risk of aspiration/pneumonia  -LT        Rehab Potential/Prognosis, Swallowing  good, to achieve stated therapy goals  -LT        Swallow Criteria for Skilled Therapeutic Interventions Met  demonstrates skilled criteria  -LT           Recommendations    Therapy Frequency (Swallow)  PRN  -LT        Predicted Duration Therapy Intervention (Days)  until discharge  -LT        SLP Diet Recommendation  soft textures;thin liquids  -LT        Recommended Precautions and Strategies  upright posture during/after eating;small bites of food and sips of liquid  -LT        SLP Rec. for Method of Medication Administration  meds whole;with pudding or applesauce;as tolerated  -LT        Monitor for Signs of Aspiration  yes;notify SLP if any concerns  -LT        Anticipated Dischage Disposition (SLP)   unknown  -LT           Swallow Goals (SLP)    Oral Nutrition/Hydration Goal Selection (SLP)  oral nutrition/hydration, SLP goal 1  -LT           Oral Nutrition/Hydration Goal 1 (SLP)    Oral Nutrition/Hydration Goal 1, SLP  tolerate least restrictive diet w/out s/s of aspiration.   -LT          User Key  (r) = Recorded By, (t) = Taken By, (c) = Cosigned By    Initials Name Effective Dates    LT Madelyn Greenwood MS CCC-SLP 06/08/18 -           EDUCATION  The patient has been educated in the following areas:   Dysphagia (Swallowing Impairment).    SLP Recommendation and Plan  SLP Swallowing Diagnosis: functional pharyngeal phase  SLP Diet Recommendation: soft textures, thin liquids  Recommended Precautions and Strategies: upright posture during/after eating, small bites of food and sips of liquid  SLP Rec. for Method of Medication Administration: meds whole, with pudding or applesauce, as tolerated     Monitor for Signs of Aspiration: yes, notify SLP if any concerns     Swallow Criteria for Skilled Therapeutic Interventions Met: demonstrates skilled criteria  Anticipated Dischage Disposition (SLP): unknown  Rehab Potential/Prognosis, Swallowing: good, to achieve stated therapy goals  Therapy Frequency (Swallow): PRN  Predicted Duration Therapy Intervention (Days): until discharge       Plan of Care Reviewed With: patient, spouse    SLP GOALS     Row Name 07/07/20 1000             Oral Nutrition/Hydration Goal 1 (SLP)    Oral Nutrition/Hydration Goal 1, SLP  tolerate least restrictive diet w/out s/s of aspiration.   -LT        User Key  (r) = Recorded By, (t) = Taken By, (c) = Cosigned By    Initials Name Provider Type    LT Madelyn Greenwood MS CCC-SLP Speech and Language Pathologist           SLP Outcome Measures (last 72 hours)      SLP Outcome Measures     Row Name 07/07/20 1000             SLP Outcome Measures    Outcome Measure Used?  Adult NOMS  -LT         Adult FCM Scores    FCM Chosen  Swallowing  -LT       Swallowing FCM Score  5  -LT        User Key  (r) = Recorded By, (t) = Taken By, (c) = Cosigned By    Initials Name Effective Dates    LT Madelyn Greenwood MS CCC-SLP 06/08/18 -            Time Calculation:   Time Calculation- SLP     Row Name 07/07/20 1047             Time Calculation- SLP    SLP Received On  07/07/20  -LT        User Key  (r) = Recorded By, (t) = Taken By, (c) = Cosigned By    Initials Name Provider Type    LT Madelyn Greenwood MS CCC-SLP Speech and Language Pathologist          Therapy Charges for Today     Code Description Service Date Service Provider Modifiers Qty    [id number] HC ST EVAL ORAL PHARYNG SWALLOW 3 7/7/2020 Madelyn Greenwood MS CCC-SLP GN 1               Madelyn Greenwood MS CCC-CRISTIAN  7/7/2020

## 2020-07-07 NOTE — PROGRESS NOTES
" LOS: 1 day     Name: Tyrone Kraft  Age: 86 y.o.  Sex: male  :  1934  MRN: [id number]         Primary Care Physician: Kinga Peguero APRN    Subjective   Subjective  Left forehead swelling improved today.  Denies any headache or visual changes.  No acute overnight events.  Only complaint is that he is being kept n.p.o. until evaluated by speech therapy.    Objective   Vital Signs  Temp:  [97.5 °F (36.4 °C)-98.8 °F (37.1 °C)] 98.5 °F (36.9 °C)  Heart Rate:  [63-89] 85  Resp:  [16-18] 16  BP: (154-184)/() 170/93  Body mass index is 24.75 kg/m².    Objective:  General Appearance:  Comfortable and in no acute distress (Weak and deconditioned appearing).    Vital signs: (most recent): Blood pressure 170/93, pulse 85, temperature 98.5 °F (36.9 °C), temperature source Oral, resp. rate 16, height 175.3 cm (69.02\"), weight 76.1 kg (167 lb 11.2 oz), SpO2 98 %.    HEENT: (Left forehead swelling with underlying hematoma.  Looks better than yesterday.  Now with periorbital ecchymosis.  Hard cervical collar in place)    Lungs:  Normal effort and normal respiratory rate.    Heart: Normal rate.  Regular rhythm.    Abdomen: Abdomen is soft.  Bowel sounds are normal.   There is no abdominal tenderness.     Extremities: There is no dependent edema or local swelling.    Neurological: Patient is alert and oriented to person, place and time.    Skin:  Warm and dry.              Results Review:       I reviewed the patient's new clinical results.    Results from last 7 days   Lab Units 20  04220  1322   WBC 10*3/mm3 16.52* 11.10*   HEMOGLOBIN g/dL 14.6 13.7   PLATELETS 10*3/mm3 200 154     Results from last 7 days   Lab Units 20  04220  1907 20  1322   SODIUM mmol/L 135*  --  134*   POTASSIUM mmol/L 4.7 4.9 5.4*   CHLORIDE mmol/L 102  --  102   CO2 mmol/L 23.8  --  27.7   BUN mg/dL 13  --  11   CREATININE mg/dL 1.24  --  1.39*   CALCIUM mg/dL 9.8  --  10.0   GLUCOSE mg/dL 115*  " --  126*                 Scheduled Meds:     ARIPiprazole 4 mg Oral Daily   cycloSPORINE 1 drop Both Eyes BID   levothyroxine 50 mcg Oral Daily   memantine 20 mg Oral Daily   pantoprazole 40 mg Oral QAM   rivastigmine 6 mg Oral BID With Meals   sodium chloride 10 mL Intravenous Q12H   tamsulosin 0.4 mg Oral Nightly   venlafaxine XR 37.5 mg Oral Daily     PRN Meds:   •  acetaminophen **OR** acetaminophen **OR** acetaminophen  •  Morphine  •  nitroglycerin  •  ondansetron  •  sodium chloride  Continuous Infusions:    sodium chloride 100 mL/hr Last Rate: 100 mL/hr (07/07/20 0802)       Assessment/Plan   Active Hospital Problems    Diagnosis  POA   • **C1 cervical fracture (CMS/HCC) [S12.000A]  Unknown   • Odontoid fracture (CMS/HCC) [S12.110A]  Yes   • Hyperkalemia [E87.5]  Unknown   • Hyponatremia [E87.1]  Unknown   • Elevated LFTs [R79.89]  Unknown   • Hematoma of frontal scalp [S00.03XA]  Unknown   • Dementia without behavioral disturbance (CMS/HCC) [F03.90]  Yes   • Acquired hypothyroidism [E03.9]  Yes   • Chronic kidney disease (CKD), stage III (moderate) (CMS/HCC) [N18.3]  Yes   • HLD (hyperlipidemia) [E78.5]  Yes      Resolved Hospital Problems   No resolved problems to display.       Assessment & Plan    -Hyperkalemia resolved and hyponatremia improving.  Once he is adequately taking things by mouth I will stop his IV fluids  -Appreciate assistance from neurosurgery who indicate plans to repeat head CT today  -LFTs are mildly elevated and appear stable.  -Renal function stable and at baseline  -Leukocytosis noted.  Likely reactive.  No evidence of active infectious process.  He is afebrile and will continue to monitor off of antibiotics  -PT  -Speech therapy to see.      Cresencio Kay MD  [facility]  07/07/20  10:11 AM

## 2020-07-08 ENCOUNTER — READMISSION MANAGEMENT (OUTPATIENT)
Dept: CALL CENTER | Facility: HOSPITAL | Age: 85
End: 2020-07-08

## 2020-07-08 VITALS
TEMPERATURE: 97.7 F | OXYGEN SATURATION: 92 % | WEIGHT: 167.7 LBS | HEIGHT: 69 IN | SYSTOLIC BLOOD PRESSURE: 131 MMHG | RESPIRATION RATE: 16 BRPM | BODY MASS INDEX: 24.84 KG/M2 | DIASTOLIC BLOOD PRESSURE: 72 MMHG | HEART RATE: 84 BPM

## 2020-07-08 LAB
DEPRECATED RDW RBC AUTO: 43 FL (ref 37–54)
ERYTHROCYTE [DISTWIDTH] IN BLOOD BY AUTOMATED COUNT: 13 % (ref 12.3–15.4)
HCT VFR BLD AUTO: 39.8 % (ref 37.5–51)
HGB BLD-MCNC: 13.9 G/DL (ref 13–17.7)
MCH RBC QN AUTO: 31.6 PG (ref 26.6–33)
MCHC RBC AUTO-ENTMCNC: 34.9 G/DL (ref 31.5–35.7)
MCV RBC AUTO: 90.5 FL (ref 79–97)
PLATELET # BLD AUTO: 179 10*3/MM3 (ref 140–450)
PMV BLD AUTO: 9.3 FL (ref 6–12)
RBC # BLD AUTO: 4.4 10*6/MM3 (ref 4.14–5.8)
WBC # BLD AUTO: 15.22 10*3/MM3 (ref 3.4–10.8)

## 2020-07-08 PROCEDURE — G0378 HOSPITAL OBSERVATION PER HR: HCPCS

## 2020-07-08 PROCEDURE — 97162 PT EVAL MOD COMPLEX 30 MIN: CPT

## 2020-07-08 PROCEDURE — 96361 HYDRATE IV INFUSION ADD-ON: CPT

## 2020-07-08 PROCEDURE — 97110 THERAPEUTIC EXERCISES: CPT

## 2020-07-08 PROCEDURE — 85027 COMPLETE CBC AUTOMATED: CPT | Performed by: INTERNAL MEDICINE

## 2020-07-08 RX ADMIN — ACEBUTOLOL HYDROCHLORIDE 200 MG: 200 CAPSULE ORAL at 08:38

## 2020-07-08 RX ADMIN — SODIUM CHLORIDE 100 ML/HR: 9 INJECTION, SOLUTION INTRAVENOUS at 07:14

## 2020-07-08 RX ADMIN — MEMANTINE HYDROCHLORIDE 20 MG: 10 TABLET, FILM COATED ORAL at 08:38

## 2020-07-08 RX ADMIN — SODIUM CHLORIDE, PRESERVATIVE FREE 10 ML: 5 INJECTION INTRAVENOUS at 08:39

## 2020-07-08 RX ADMIN — RIVASTIGMINE TARTRATE 6 MG: 1.5 CAPSULE ORAL at 08:38

## 2020-07-08 RX ADMIN — VENLAFAXINE HYDROCHLORIDE 37.5 MG: 37.5 CAPSULE, EXTENDED RELEASE ORAL at 08:38

## 2020-07-08 RX ADMIN — LEVOTHYROXINE SODIUM 50 MCG: 50 TABLET ORAL at 06:44

## 2020-07-08 RX ADMIN — ARIPIPRAZOLE 4 MG: 2 TABLET ORAL at 08:38

## 2020-07-08 RX ADMIN — CYCLOSPORINE 1 DROP: 0.5 EMULSION OPHTHALMIC at 08:38

## 2020-07-08 RX ADMIN — PANTOPRAZOLE SODIUM 40 MG: 40 TABLET, DELAYED RELEASE ORAL at 06:44

## 2020-07-08 NOTE — PLAN OF CARE
Problem: Patient Care Overview  Goal: Plan of Care Review  Outcome: Ongoing (interventions implemented as appropriate)  Flowsheets (Taken 7/8/2020 0607)  Progress: improving  Plan of Care Reviewed With: patient  Outcome Summary: no complaints of pain. baseline confusion. wife at bedside. pulled out IV, replaced and IV wrapped. IVFs. swelling has improved greatly. bruising on left head/face, and left knee. neuro sx signed off, follow up outpatient. vss. will continue to monitor.     Problem: Fall Risk (Adult)  Goal: Identify Related Risk Factors and Signs and Symptoms  Description  Related risk factors and signs and symptoms are identified upon initiation of Human Response Clinical Practice Guideline (CPG).  Outcome: Ongoing (interventions implemented as appropriate)     Problem: Skin Injury Risk (Adult)  Goal: Identify Related Risk Factors and Signs and Symptoms  Description  Related risk factors and signs and symptoms are identified upon initiation of Human Response Clinical Practice Guideline (CPG).  Outcome: Ongoing (interventions implemented as appropriate)

## 2020-07-08 NOTE — PLAN OF CARE
Problem: Patient Care Overview  Goal: Plan of Care Review  Outcome: Outcome(s) achieved  Note:   Patient discharging home with [facility] home health. Walker delivered by Omar and given to patient, paperwork given to wife.   Goal: Individualization and Mutuality  Outcome: Outcome(s) achieved  Goal: Discharge Needs Assessment  Outcome: Outcome(s) achieved  Goal: Interprofessional Rounds/Family Conf  Outcome: Outcome(s) achieved     Problem: Fall Risk (Adult)  Goal: Identify Related Risk Factors and Signs and Symptoms  Outcome: Outcome(s) achieved  Goal: Absence of Fall  Outcome: Outcome(s) achieved     Problem: Skin Injury Risk (Adult)  Goal: Identify Related Risk Factors and Signs and Symptoms  Outcome: Outcome(s) achieved  Goal: Skin Health and Integrity  Outcome: Outcome(s) achieved     Problem: Pain, Acute (Adult)  Goal: Identify Related Risk Factors and Signs and Symptoms  Outcome: Outcome(s) achieved  Goal: Acceptable Pain Control/Comfort Level  Outcome: Outcome(s) achieved

## 2020-07-08 NOTE — PROGRESS NOTES
Case Management Discharge Note      Final Note: DC'd home with his wife and [facility] .  New walker thru [facility]    Provided Post Acute Provider List?: Yes  Post Acute Provider List: Home Health, Nursing Home(list of NH's by area, HH and [facility])  Delivered To: Support Person  Support Person: Briseida Kraft  Method of Delivery: In person    Destination      No service has been selected for the patient.      Durable Medical Equipment      Service Provider Request Status Selected Services Address Phone Number Fax Number    [facility] Selected Durable Medical Equipment [address] [address] [phone]      Dialysis/Infusion      No service has been selected for the patient.      Home Medical Care      Service Provider Request Status Selected Services Address Phone Number Fax Number    [facility] [place] Selected Home Health Services [address] [place] [address] [address] [phone]      Therapy      No service has been selected for the patient.      Community Resources      No service has been selected for the patient.             Final Discharge Disposition Code: 06 - home with home health care([facility] )

## 2020-07-08 NOTE — DISCHARGE SUMMARY
Date of Admission: 7/6/2020  Date of Discharge:  7/8/2020  Primary Care Physician: Kinga Peguero APRN     Discharge Diagnosis:  Active Hospital Problems    Diagnosis  POA   • **C1 cervical fracture (CMS/[facility]) [S12.000A]  Unknown   • Odontoid fracture (CMS/HCC) [S12.110A]  Yes   • Hyperkalemia [E87.5]  Unknown   • Hyponatremia [E87.1]  Unknown   • Elevated LFTs [R79.89]  Unknown   • Hematoma of frontal scalp [S00.03XA]  Unknown   • Dementia without behavioral disturbance (CMS/[facility]) [F03.90]  Yes   • Acquired hypothyroidism [E03.9]  Yes   • Chronic kidney disease (CKD), stage III (moderate) (CMS/HCC) [N18.3]  Yes   • HLD (hyperlipidemia) [E78.5]  Yes      Resolved Hospital Problems   No resolved problems to display.       DETAILS OF HOSPITAL STAY     Pertinent Test Results and Procedures Performed    Chest x-ray:  1. No active disease is seen chest with no significant change when  compared to prior chest x-ray generated 13th 2016  2. Left subclavian transvenous pacemaker is placed tip in the right  ventricular apex and there is been previous median sternotomy there are  linear areas horizontal linear areas of atelectasis or scarring at the  lung bases.    CT scan of the cervical spine on 7/6/2020:  1. Since prior cervical spine CT 03/28/2020, the patient has had head  trauma today and has an acute nondisplaced fracture of the right  posterolateral ring of C1 and has developed an additional hairline acute  nondisplaced fracture through the base of the odontoid process  compatible with an acute nondisplaced type II odontoid process fracture  which is an unstable fracture and neurosurgical consultation is  warranted.  2. The remainder of the cervical spine CT is unchanged with diffuse  cervical spondylosis as described in great detail above.    Head CT on 7/6/2020:  1. There is moderate to severe small vessel disease in the cerebral  white matter and diffuse cerebral atrophy and the lateral and  third  ventricles are prominent size felt to be due to central volume loss or  atrophy.  2. Very large scalp hematoma over the anterolateral left frontal bone  extending to the left supraorbital scalp from today's head trauma. The  remainder of the head CT is within normal limits with no acute skull  fracture or intracranial hemorrhage identified.    Repeat head CT on 7/7/2020:  Again identified is a large scalp hematoma in the frontal  region and in the left parietotemporal region. The frontal component  appears similar in size as compared to the prior examination however the  left parietal and parietotemporal components are new/increased. There is  a questionable area of increased attenuation, extra-axial overlying the  anterior aspect of the insular cortex to the left measuring 1.5 mm in  size. This may be artifactual however a small area of subarachnoid  hemorrhage or potentially an intra-arterial thrombus cannot be excluded.  Clinical correlation and a short-term follow-up CT examination of the  brain is recommended.    X-ray of the cervical spine:  The patient has CT scan performed yesterday demonstrating a nondisplaced  fracture involving the right posterior ring of C1 and a hairline  nondisplaced fracture through the base of the odontoid process. These  are not visible on plain film. There is no subluxation abnormality. The  exam is limited by difficulty in positioning the patient for standard  views, but there are severe generalized degenerative changes as  described in detail on yesterday's CT scan.    Hospital Course    This is a an 86-year-old male who suffered a slip and fall at home resulting in head trauma.  Please see H&P for full details of admission.  He was found to have a odontoid and C1 fracture.  He is placed in a hard cervical collar.  He also had a large left frontal scalp hematoma but no evidence of intracranial hemorrhage on presentation.  He was admitted and evaluated by neurosurgery who  repeated a head CT the following day as described above.  He also had follow-up x-rays of the cervical spine.  They reevaluated him yesterday and felt that his cervical fractures could be managed with hard cervical collar and that he did not need any form of surgical intervention or immediate follow-up imaging.  Neurosurgery cleared him for discharge.  They have set up an appointment for repeat head CT on July 23 and repeat x-rays of the cervical spine in 10 to 14 days.  He will also see neurosurgery in the office on July 23.  He has been instructed to continue to wear the cervical collar at all times until instructed otherwise by neurosurgery.  He worked with physical therapy today and I discussed with his wife at the bedside who would like to take him home with home health and a walker.  He did well enough with PT that this is okay from my standpoint and I think he would do better in that setting rather than in a skilled nursing facility anyway.  At this point he is medically stable and will be released home.  He will remain off of aspirin for now until he has follow-up imaging.    Physical Exam at Discharge:  General: No acute distress, AAOx3, chronically ill-appearing and somewhat frail.  HEENT: EOMI, PERRL, large left frontal scalp hematoma which is improving from the time of his admission.  Hard cervical collar in place  Cardiovascular: +s1 and s2, RRR  Lungs: No rhonchi or wheezing  Abdomen: soft, nontender    Consults:   Consults     Date and Time Order Name Status Description    7/6/2020 1744 Inpatient Neurosurgery Consult      7/6/2020 1334 [facility] (on-call MD unless specified) Details Completed     7/6/2020 1334 Neurosurgery (on-call MD unless specified) Completed             Condition on Discharge: Stable    Discharge Disposition  Home or Self Care    Discharge Medications     Discharge Medications      Changes to Medications      Instructions Start Date   tamsulosin 0.4 MG capsule 24 hr capsule  Commonly  known as:  FLOMAX  What changed:  when to take this   0.4 mg, Oral, Daily         Continue These Medications      Instructions Start Date   acebutolol 200 MG capsule  Commonly known as:  SECTRAL   200 mg, Oral, Daily      ARIPiprazole 2 MG tablet  Commonly known as:  ABILIFY   4 mg, Oral, Daily      calcium polycarbophil 625 MG tablet  Commonly known as:  FIBERCON   1,350 mg, Oral, 2 times daily      cycloSPORINE 0.05 % ophthalmic emulsion  Commonly known as:  RESTASIS   1 drop, Both Eyes, 2 Times Daily      esomeprazole 40 MG capsule  Commonly known as:  nexIUM   1 capsule, Oral, Daily      fish oil 1000 MG capsule capsule   1,000 mg, Oral, Daily With Breakfast      Glucosamine 500 MG capsule   1 capsule, Oral, Daily      levothyroxine 50 MCG tablet  Commonly known as:  SYNTHROID, LEVOTHROID   50 mcg, Oral, Daily      Loratadine 10 MG capsule   1 tablet, Oral, Daily      melatonin 5 MG tablet tablet   10 mg, Oral, Nightly      memantine 10 MG tablet  Commonly known as:  NAMENDA   20 mg, Oral, Daily      MULTIVITAMIN ADULTS PO   1 tablet, Oral, Daily      rivastigmine 6 MG capsule  Commonly known as:  EXELON   1 capsule, Oral, 2 Times Daily      traZODone 50 MG tablet  Commonly known as:  DESYREL   1 tablet, Oral, Nightly      venlafaxine XR 37.5 MG 24 hr capsule  Commonly known as:  EFFEXOR-XR   37.5 mg, Oral, Daily         Stop These Medications    aspirin 81 MG tablet            Discharge Diet:   Diet Instructions     Diet: Regular, Soft Texture; Thin Liquids, No Restrictions; Chopped      Discharge Diet:   Regular  Soft Texture       Fluid Consistency:  Thin Liquids, No Restrictions    Soft Options:  Chopped          Activity at Discharge:   Activity Instructions     Activity as Tolerated            Follow-up Appointments  Future Appointments   Date Time Provider Department Center   7/23/2020 11:30 AM [facility] 2  [facility]   7/23/2020 12:30 PM Jose Pennington MD [facility]     Additional  Instructions for the Follow-ups that You Need to Schedule     Ambulatory Referral to Home Health   As directed      Face to Face Visit Date:  7/8/2020    Follow-up provider for Plan of Care?:  I treated the patient in an acute care facility and will not continue treatment after discharge.    Follow-up provider:  KINGA PEGUERO [[id number]]    Reason/Clinical Findings:  C1/ondontoid fracture, weakness and deconditioning    Describe mobility limitations that make leaving home difficult:  C1/ondontoid fracture, weakness and deconditioning    Nursing/Therapeutic Services Requested:  Physical Therapy    Frequency:  1 Week 1         Discharge Follow-up with PCP   As directed       Currently Documented PCP:    Kinga Peguero APRN    PCP Phone Number:    [phone]     Follow Up Details:  1-2 weeks         Discharge Follow-up with Specified Provider: Rhina ROCHA or Dr Pennington of Neurosurgery in a couple weeks per their recommendations   As directed      To:  Rhina ROCHA or Dr Pennington of Neurosurgery in a couple weeks per their recommendations    Follow Up Details:  Their office to arrange             I have examined and discussed discharge planning with the patient today.     Cresencio Kay MD  07/08/20  10:09 AM    Time: Discharge 25 min

## 2020-07-08 NOTE — THERAPY EVALUATION
Patient Name: Tyrone Kraft  : 1934    MRN: [id number]                              Today's Date: 2020       Admit Date: 2020    Visit Dx:     ICD-10-CM ICD-9-CM   1. Closed odontoid fracture, initial encounter (CMS/HCC) S12.100A 805.02   2. Closed nondisplaced fracture of first cervical vertebra, unspecified fracture morphology, initial encounter (CMS/HCC) S12.001A 805.01   3. Contusion of scalp, initial encounter S00.03XA 920   4. Hyperkalemia E87.5 276.7     Patient Active Problem List   Diagnosis   • Benign paroxysmal positional nystagmus   • Chronic kidney failure   • Chronic kidney disease (CKD), stage III (moderate) (CMS/[facility])   • Enlarged prostate   • HLD (hyperlipidemia)   • MGUS (monoclonal gammopathy of unknown significance)   • Borderline diabetes   • Acquired hypothyroidism   • Elevated white blood cell count   • Perennial allergic rhinitis   • Dementia without behavioral disturbance (CMS/[facility])   • Odontoid fracture (CMS/[facility])   • Hyperkalemia   • Hyponatremia   • Elevated LFTs   • Hematoma of frontal scalp   • C1 cervical fracture (CMS/[facility])     Past Medical History:   Diagnosis Date   • BPPV (benign paroxysmal positional vertigo) 2015    I have put in a script for meclizine to take at night.  If he is not better in a week, will need to send to ENT for Eply maneuvers.   • Chronic kidney disease     Chronic. CR previously 1.6-2 but it was 1.4 in , f/b Dr. Goff   • Chronic kidney disease 2015    Stable. he sees Dr. Goff.  I will send his labs to [facility].   • Chronic kidney disease 2015    Stable.  Cont to monitor q4-6 mo.   • Coronary arteriosclerosis     MI in  treated medically, had cath and then CABG in  in [place], had stents in  in [place].  Myoview 10/2014  MI but no ischemia.   • Dementia without behavioral disturbance (CMS/HCC) 2015    Managed by Psychiatrist. I have encouraged his wife to be very vigilant with his driving and his  reation time.  According to them, his psychiatrist is OK with him driving.   • Enlarged prostate without lower urinary tract symptoms (luts)    • Gastroparesis    • GERD (gastroesophageal reflux disease)    • Hypercalcemia 02/03/2015    Recheck labs in 3 months.   • Hyperlipidemia 08/13/2015    Very well controlled.  cont current meds.   • Hypertension 08/13/2015    Controlled. Cont current meds.   • Ischemic cardiomyopathy     Reported history, s/p singl lead Medtronic ICD 2006 in [place], no details available; repeat echo 2/2013 showed normal LV size and function, EF 55%   • MGUS (monoclonal gammopathy of unknown significance)    • Nonsustained ventricular tachycardia (CMS/HCC)     Noted incidentally on ECD interrogation   • KELLY (obstructive sleep apnea)      Past Surgical History:   Procedure Laterality Date   • ANGIOPLASTY      Cath Stent Placement   • CARDIAC DEFIBRILLATOR PLACEMENT      Medtronic singl lead, 2006, [facility]   • CORONARY ARTERY BYPASS GRAFT     • TOE SURGERY       General Information     Row Name 07/08/20 1041          PT Evaluation Time/Intention    Document Type  evaluation  -EM     Mode of Treatment  individual therapy;physical therapy  -EM     Row Name 07/08/20 1041          General Information    Patient Profile Reviewed?  yes  -EM     Prior Level of Function  independent:;all household mobility  -EM     Existing Precautions/Restrictions  fall;brace worn when out of bed pt has Aspen Collar to be on at all times   -EM     Row Name 07/08/20 1041          Relationship/Environment    Lives With  spouse  -EM     Row Name 07/08/20 1041          Resource/Environmental Concerns    Current Living Arrangements  home/apartment/condo  -EM     Row Name 07/08/20 1041          Home Main Entrance    Number of Stairs, Main Entrance  three  -EM     Row Name 07/08/20 1041          Stairs Within Home, Primary    Stairs, Within Home, Primary  full flight of stairs up to bedroom   -EM     Number of Stairs,  Within Home, Primary  ten  -EM     Row Name 07/08/20 1041          Cognitive Assessment/Intervention- PT/OT    Orientation Status (Cognition)  oriented to;person;place  -EM     Row Name 07/08/20 1041          Safety Issues, Functional Mobility    Safety Issues Affecting Function (Mobility)  judgment  -EM     Impairments Affecting Function (Mobility)  balance;endurance/activity tolerance;strength;pain  -EM       User Key  (r) = Recorded By, (t) = Taken By, (c) = Cosigned By    Initials Name Provider Type    Azul Antonio PT Physical Therapist        Mobility     Row Name 07/08/20 1043          Bed Mobility Assessment/Treatment    Bed Mobility Assessment/Treatment  supine-sit;sit-supine  -EM     Supine-Sit [place] (Bed Mobility)  minimum assist (75% patient effort);verbal cues  -EM     Sit-Supine [place] (Bed Mobility)  minimum assist (75% patient effort);verbal cues  -EM     Assistive Device (Bed Mobility)  bed rails;head of bed elevated  -EM     Row Name 07/08/20 1043          Sit-Stand Transfer    Sit-Stand [place] (Transfers)  contact guard  -EM     Assistive Device (Sit-Stand Transfers)  walker, front-wheeled  -EM     Row Name 07/08/20 1043          Gait/Stairs Assessment/Training    Gait/Stairs Assessment/Training  gait/ambulation independence  -EM     [place] Level (Gait)  minimum assist (75% patient effort);contact guard  -EM     Assistive Device (Gait)  walker, front-wheeled  -EM     Distance in Feet (Gait)  150 (walked last few feet without rwx, increased assistance needed, pt reaching out for objects to steady self)   -EM     Deviations/Abnormal Patterns (Gait)  gait speed decreased;stride length decreased  -EM     Comment (Gait/Stairs)  pt c/o dizziness, steadier with use of rwx although assistance to guide rwx   -EM       User Key  (r) = Recorded By, (t) = Taken By, (c) = Cosigned By    Initials Name Provider Type    Azul Antonio PT Physical Therapist         Obj/Interventions     [facility] Name 07/08/20 1044          General ROM    GENERAL ROM COMMENTS  ROM WNL   -EM     [facility] Name 07/08/20 1044          MMT (Manual Muscle Testing)    General MMT Comments  no focal deficits identified   -EM     Row Name 07/08/20 1044          Therapeutic Exercise    Lower Extremity (Therapeutic Exercise)  LAQ (long arc quad), bilateral  -EM     Lower Extremity Range of Motion (Therapeutic Exercise)  ankle dorsiflexion/plantar flexion, bilateral  -EM     Row Name 07/08/20 1044          Static Sitting Balance    Level of [place] (Unsupported Sitting, Static Balance)  supervision  -EM     Sitting Position (Unsupported Sitting, Static Balance)  sitting on edge of bed  -EM     Row Name 07/08/20 1044          Dynamic Sitting Balance    Level of [place], Reaches Outside Midline (Sitting, Dynamic Balance)  supervision  -EM     Sitting Position, Reaches Outside Midline (Sitting, Dynamic Balance)  sitting on edge of bed  -EM     Row Name 07/08/20 1044          Static Standing Balance    Level of [place] (Supported Standing, Static Balance)  contact guard assist  -EM     Assistive Device Utilized (Supported Standing, Static Balance)  walker, rolling  -EM     Row Name 07/08/20 1044          Dynamic Standing Balance    Level of [place], Reaches Outside Midline (Standing, Dynamic Balance)  contact guard assist;minimal assist, 75% patient effort  -EM     Assistive Device Utilized (Supported Standing, Dynamic Balance)  walker, rolling  -EM     Row Name 07/08/20 1044          Sensory Assessment/Intervention    Sensory General Assessment  no sensation deficits identified  -EM       User Key  (r) = Recorded By, (t) = Taken By, (c) = Cosigned By    Initials Name Provider Type    EM Azul Bower, PT Physical Therapist        Goals/Plan     Row Name 07/08/20 1047          Bed Mobility Goal 1 (PT)    Activity/Assistive Device (Bed Mobility Goal 1, PT)  bed mobility activities, all  -EM      [place] Level/Cues Needed (Bed Mobility Goal 1, PT)  supervision required  -EM     Time Frame (Bed Mobility Goal 1, PT)  1 week  -EM     Row Name 07/08/20 1047          Transfer Goal 1 (PT)    Activity/Assistive Device (Transfer Goal 1, PT)  transfers, all;walker, rolling  -EM     [place] Level/Cues Needed (Transfer Goal 1, PT)  supervision required  -EM     Time Frame (Transfer Goal 1, PT)  1 week  -EM     Row Name 07/08/20 1047          Gait Training Goal 1 (PT)    Activity/Assistive Device (Gait Training Goal 1, PT)  gait (walking locomotion);walker, rolling  -EM     [place] Level (Gait Training Goal 1, PT)  contact guard assist  -EM     Distance (Gait Goal 1, PT)  200  -EM     Time Frame (Gait Training Goal 1, PT)  1 week  -EM       User Key  (r) = Recorded By, (t) = Taken By, (c) = Cosigned By    Initials Name Provider Type    EM Azul Bower, PT Physical Therapist        Clinical Impression     Row Name 07/08/20 1046          Pain Assessment    Additional Documentation  Pain Scale: Numbers Pre/Post-Treatment (Group)  -EM     Row Name 07/08/20 1046          Pain Scale: Numbers Pre/Post-Treatment    Pain Scale: Numbers, Pretreatment  5/10  -EM     Pain Location  head  -EM     Pain Intervention(s)  Medication (See MAR);Repositioned  -EM     Row Name 07/08/20 1046          Plan of Care Review    Plan of Care Reviewed With  patient;spouse  -EM     Row Name 07/08/20 1046          Physical Therapy Clinical Impression    Patient/Family Goals Statement (PT Clinical Impression)  return home   -EM     Criteria for Skilled Interventions Met (PT Clinical Impression)  yes;treatment indicated  -EM     Rehab Potential (PT Clinical Summary)  good, to achieve stated therapy goals  -EM     Row Name 07/08/20 1046          Positioning and Restraints    Pre-Treatment Position  in bed  -EM     Post Treatment Position  bed  -EM     In Bed  supine;call light within reach;with family/caregiver;notified nsg   -EM       User Key  (r) = Recorded By, (t) = Taken By, (c) = Cosigned By    Initials Name Provider Type    Azul Antonio PT Physical Therapist        Outcome Measures     Row Name 07/08/20 1049          How much help from another person do you currently need...    Turning from your back to your side while in flat bed without using bedrails?  3  -EM     Moving from lying on back to sitting on the side of a flat bed without bedrails?  3  -EM     Moving to and from a bed to a chair (including a wheelchair)?  3  -EM     Standing up from a chair using your arms (e.g., wheelchair, bedside chair)?  3  -EM     Climbing 3-5 steps with a railing?  3  -EM     To walk in hospital room?  3  -EM     AM-PAC 6 Clicks Score (PT)  18  -EM     Row Name 07/08/20 1049          Functional Assessment    Outcome Measure Options  AM-PAC 6 Clicks Basic Mobility (PT)  -EM       User Key  (r) = Recorded By, (t) = Taken By, (c) = Cosigned By    Initials Name Provider Type    Azul Antonio PT Physical Therapist        Physical Therapy Education                 Title: PT OT SLP Therapies (In Progress)     Topic: Physical Therapy (In Progress)     Point: Mobility training (Done)     Description:   Instruct learner(s) on safety and technique for assisting patient out of bed, chair or wheelchair.  Instruct in the proper use of assistive devices, such as walker, crutches, cane or brace.              Patient Friendly Description:   It's important to get you on your feet again, but we need to do so in a way that is safe for you. Falling has serious consequences, and your personal safety is the most important thing of all.        When it's time to get out of bed, one of us or a family member will sit next to you on the bed to give you support.     If your doctor or nurse tells you to use a walker, crutches, a cane, or a brace, be sure you use it every time you get out of bed, even if you think you don't need it.    Learning  Progress Summary           Patient Acceptance, E, VU by EM at 7/8/2020 1050   Significant Other Acceptance, E, VU by EM at 7/8/2020 1050                   Point: Home exercise program (Not Started)     Description:   Instruct learner(s) on appropriate technique for monitoring, assisting and/or progressing patient with therapeutic exercises and activities.              Learner Progress:   Not documented in this visit.          Point: Body mechanics (Not Started)     Description:   Instruct learner(s) on proper positioning and spine alignment for patient and/or caregiver during mobility tasks and/or exercises.              Learner Progress:   Not documented in this visit.          Point: Precautions (Done)     Description:   Instruct learner(s) on prescribed precautions during mobility and gait tasks              Learning Progress Summary           Patient Acceptance, E, VU by EM at 7/8/2020 1050   Significant Other Acceptance, E, VU by EM at 7/8/2020 1050                               User Key     Initials Effective Dates Name Provider Type Discipline     04/03/18 -  Azul Bower, PT Physical Therapist PT              PT Recommendation and Plan  Planned Therapy Interventions (PT Eval): bed mobility training, gait training, home exercise program, transfer training, patient/family education  Outcome Summary/Treatment Plan (PT)  Anticipated Discharge Disposition (PT): home with assist, home with home health  Plan of Care Reviewed With: patient, spouse  Outcome Summary: Patient is a 86 y.o. male admitted to [facility] for fall with resultant odontoid, C2 fx on 7/6/2020. PMHx includes dementia, CAD, CKD. Patient is independent at baseline and lives with his wife. Patient confused but cooperative, c/o dizziness and pain in his head which he rates at 5/10. Today, patient performed bed mobility with [place], required CGA for transfers, and ambulated 150 feet with rwx and CGA.  Patient demonstrates impairments consisting of  generalized weakness and pain, decreased balance, decreased activity tolerance. Patient may benefit from skilled PT services acutely to address functional deficits as well as improve level of independence prior to discharge. Anticipate home with assist and HH upon DC. Discussed with spouse at the bedside, recommend rwx for home use as patient steadier with use of rwx, also recommend 24/7 supervision as patient remains a high falls risk. Wife verbalized understanding, feels patient will do better at home due to baseline dementia and current pandemic situation.     Time Calculation:   PT Charges     Row Name 07/08/20 1054             Time Calculation    Start Time  0905  -EM      Stop Time  0928  -EM      Time Calculation (min)  23 min  -EM      PT Received On  07/08/20  -EM      PT - Next Appointment  07/09/20  -EM      PT Goal Re-Cert Due Date  07/15/20  -EM         Time Calculation- PT    Total Timed Code Minutes- PT  15 minute(s)  -EM        User Key  (r) = Recorded By, (t) = Taken By, (c) = Cosigned By    Initials Name Provider Type    EM Azul Bower, PT Physical Therapist        Therapy Charges for Today     Code Description Service Date Service Provider Modifiers Qty    77192716538 HC PT EVAL MOD COMPLEXITY 2 7/8/2020 Azul Bower, PT GP 1    15442724503 HC PT THER PROC EA 15 MIN 7/8/2020 Azul Bower, PT GP 1          PT G-Codes  Outcome Measure Options: AM-PAC 6 Clicks Basic Mobility (PT)  AM-PAC 6 Clicks Score (PT): 18    Azul Bower PT  7/8/2020

## 2020-07-08 NOTE — DISCHARGE PLACEMENT REQUEST
"Zuleyma Helms (86 y.o. Male)     Date of Birth Social Security Number Address Home Phone MRN    1934  [facility] [address] [phone] [id number]    [place] Marital Status          None        Admission Date Admission Type Admitting Provider Attending Provider Department, Room/Bed    7/6/20 Emergency Cresencio Kay MD [place], Robert Russell, MD [address], [address]    Discharge Date Discharge Disposition Discharge Destination                       Attending Provider:  Cresencio Kay MD    Allergies:  No Known Allergies    Isolation:  None   Infection:  None   Code Status:  CPR    Ht:  175.3 cm (69.02\")   Wt:  76.1 kg (167 lb 11.2 oz)    Admission Cmt:  None   Principal Problem:  C1 cervical fracture (CMS/[facility]) [S12.000A]                 Active Insurance as of 7/6/2020     Primary Coverage     Payor Plan Insurance Group Employer/Plan Group    [facility] MEDICARE REPLACEMENT [facility] MEDICARE REPLACEMENT [id number]     Payor Plan Address Payor Plan Phone Number Payor Plan Fax Number Effective Dates    [address] [address]   1/1/2016 - None Entered    [facility] [id number]       Subscriber Name Subscriber Birth Date Member ID       ZULEYMA HELMS 1934 [id number]                 Emergency Contacts      (Rel.) Home Phone Work Phone Mobile Phone    Maria Helms (Spouse) [phone] -- --              "

## 2020-07-08 NOTE — PROGRESS NOTES
Discharge Planning Assessment  [facility]     Patient Name: Tyrone Kraft  MRN: [id number]  Today's Date: 7/8/2020    Admit Date: 7/6/2020    Discharge Needs Assessment     Row Name 07/08/20 0914       Living Environment    Lives With  spouse;child(isa), adult    Name(s) of Who Lives With Patient  Maria Kraft [phone] Spouse    Current Living Arrangements  home/apartment/condo    Primary Care Provided by  self;spouse/significant other    Provides Primary Care For  no one    Family Caregiver if Needed  spouse;child(isa), adult    Family Caregiver Names  Maria Kraft [phone] Spouse    Quality of Family Relationships  supportive;involved;helpful       Resource/Environmental Concerns    Resource/Environmental Concerns  none       Transition Planning    Patient/Family Anticipates Transition to  home with family;home with help/services    Patient/Family Anticipated Services at Transition  none    Transportation Anticipated  family or friend will provide       Discharge Needs Assessment    Equipment Currently Used at Home  walker, standard;commode;cane, straight    Provided Post Acute Provider List?  Yes    Post Acute Provider List  Home Health;Nursing Home list of NH's by area, HH and [facility]    Delivered To  Support Person    Support Person  Wife Maria Kraft    Method of Delivery  In person    Current Discharge Risk  cognitively impaired;physical impairment        Discharge Plan     Row Name 07/08/20 0917       Plan    Plan  Assessing for Home with HH vs inpt rehab    Patient/Family in Agreement with Plan  yes    Plan Comments  Pt's IMM signed 7/7/2020.  Spoke with pt's wife Maria Kraft [phone] at bedside to screen for DC plan/needs.  Pt's wife stated that pt does live at home with her and their adult son. Pt's wife stated that they do have 3 steps to enter home and their bedroom and bathroom are on the second floor. Pt stated that their son can assist with pt at home but does also work.  Pt's wife  stated that pt does have dementia but has been independent with moblity with out a cane or walker and minimal assistance with ADL's.  Discussed DC options  of [facility] vs home with HH.  Pt's wife stated that she would prefer pt to be able to return home with her and HH if at all possible.  Informed pt's wife that CCP would await PT eval and continue to follow pt's progress with treatment team to work on DCP.          Destination      Coordination has not been started for this encounter.      Durable Medical Equipment      Coordination has not been started for this encounter.      Dialysis/Infusion      Coordination has not been started for this encounter.      Home Medical Care      Coordination has not been started for this encounter.      Therapy      Coordination has not been started for this encounter.      Community Resources      Coordination has not been started for this encounter.          Demographic Summary     Row Name 07/08/20 0909       General Information    Admission Type  inpatient    Arrived From  home    Referral Source  admission list    Reason for Consult  discharge planning    Preferred Language  English     Used During This Interaction  no        Functional Status     Row Name 07/08/20 0913       Functional Status    Usual Activity Tolerance  moderate    Current Activity Tolerance  fair       Functional Status, IADL    Medications  assistive person    Meal Preparation  assistive person    Housekeeping  completely dependent    Laundry  completely dependent    Shopping  completely dependent       Mental Status    General Appearance WDL  WDL        Psychosocial    No documentation.       Abuse/Neglect    No documentation.       Legal    No documentation.       Substance Abuse    No documentation.       Patient Forms    No documentation.           NICO Tomas

## 2020-07-08 NOTE — PLAN OF CARE
Problem: Patient Care Overview  Goal: Plan of Care Review  Flowsheets  Taken 7/8/2020 1046  Plan of Care Reviewed With: patient;spouse  Taken 7/8/2020 1050  Outcome Summary: Patient is a 86 y.o. male admitted to [facility] for fall with resultant odontoid, C2 fx on 7/6/2020. PMHx includes dementia, CAD, CKD. Patient is independent at baseline and lives with his wife. Patient confused but cooperative, c/o dizziness and pain in his head which he rates at 5/10. Today, patient performed bed mobility with [place], required CGA for transfers, and ambulated 150 feet with rwx and CGA.  Patient demonstrates impairments consisting of generalized weakness and pain, decreased balance, decreased activity tolerance. Patient may benefit from skilled PT services acutely to address functional deficits as well as improve level of independence prior to discharge. Anticipate home with assist and HH upon DC. Discussed with spouse at the bedside, recommend rwx for home use as patient steadier with use of rwx, also recommend 24/7 supervision as patient remains a high falls risk. Wife verbalized understanding, feels patient will do better at home due to baseline dementia and current pandemic situation.  Note:       Patient was wearing a face mask during this therapy encounter. Therapist used appropriate personal protective equipment including mask and gloves.  Mask used was standard procedure mask. Appropriate PPE was worn during the entire therapy session. Hand hygiene was completed before and after therapy session. Patient is not in enhanced droplet precautions.

## 2020-07-08 NOTE — OUTREACH NOTE
Prep Survey      Responses   [facility] facility patient discharged from?  [place]   Is LACE score < 7 ?  No   Eligibility  [facility]   Date of Admission  07/06/20   Date of Discharge  07/01/20   Discharge Disposition  Home or Self Care   Discharge diagnosis  Cervial fracture   COVID-19 Test Status  Not tested   Does the patient have one of the following disease processes/diagnoses(primary or secondary)?  Other   Does the patient have Home health ordered?  Yes   What is the Home health agency?   [facility]   Is there a DME ordered?  Yes   What DME was ordered?  walker thru [facility]   Prep survey completed?  Yes          Meghana Van RN

## 2020-07-09 ENCOUNTER — TRANSITIONAL CARE MANAGEMENT TELEPHONE ENCOUNTER (OUTPATIENT)
Dept: CALL CENTER | Facility: HOSPITAL | Age: 85
End: 2020-07-09

## 2020-07-09 ENCOUNTER — TELEPHONE (OUTPATIENT)
Dept: FAMILY MEDICINE CLINIC | Facility: CLINIC | Age: 85
End: 2020-07-09

## 2020-07-09 NOTE — OUTREACH NOTE
Call Center TCM Note      Responses   [facility] patient discharged from?  [place]   COVID-19 Test Status  Not tested   Does the patient have one of the following disease processes/diagnoses(primary or secondary)?  Other   TCM attempt successful?  No   Unsuccessful attempts  Attempt 1          Shandra Valentine RN    7/9/2020, 15:18

## 2020-07-09 NOTE — TELEPHONE ENCOUNTER
Kinga would like this patient to be called to see if he would like to switch this to a telehealth visit.  She thinks it would be better for him to do the visit that way.  It can be a video or telephone visit.  Please let me know what he decides to do after you speak with him.  Thank you

## 2020-07-09 NOTE — OUTREACH NOTE
Call Center TCM Note      Responses   [facility] patient discharged from?  [place]   COVID-19 Test Status  Not tested   Does the patient have one of the following disease processes/diagnoses(primary or secondary)?  Other   TCM attempt successful?  No   Unsuccessful attempts  Attempt 2          Shandra Valentine RN    7/9/2020, 15:32

## 2020-07-10 ENCOUNTER — OFFICE VISIT (OUTPATIENT)
Dept: FAMILY MEDICINE CLINIC | Facility: CLINIC | Age: 85
End: 2020-07-10

## 2020-07-10 ENCOUNTER — TELEPHONE (OUTPATIENT)
Dept: NEUROSURGERY | Facility: CLINIC | Age: 85
End: 2020-07-10

## 2020-07-10 ENCOUNTER — TRANSITIONAL CARE MANAGEMENT TELEPHONE ENCOUNTER (OUTPATIENT)
Dept: CALL CENTER | Facility: HOSPITAL | Age: 85
End: 2020-07-10

## 2020-07-10 VITALS
WEIGHT: 165 LBS | RESPIRATION RATE: 16 BRPM | SYSTOLIC BLOOD PRESSURE: 146 MMHG | DIASTOLIC BLOOD PRESSURE: 70 MMHG | OXYGEN SATURATION: 94 % | TEMPERATURE: 97.7 F | HEIGHT: 69 IN | HEART RATE: 75 BPM | BODY MASS INDEX: 24.44 KG/M2

## 2020-07-10 DIAGNOSIS — Z09 HOSPITAL DISCHARGE FOLLOW-UP: Primary | ICD-10-CM

## 2020-07-10 DIAGNOSIS — S12.001D CLOSED NONDISPLACED FRACTURE OF FIRST CERVICAL VERTEBRA WITH ROUTINE HEALING, UNSPECIFIED FRACTURE MORPHOLOGY, SUBSEQUENT ENCOUNTER: ICD-10-CM
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
## 2020-07-10 DIAGNOSIS — S12.100D CLOSED ODONTOID FRACTURE WITH ROUTINE HEALING, SUBSEQUENT ENCOUNTER: ICD-10-CM

## 2020-07-10 PROCEDURE — 99495 TRANSJ CARE MGMT MOD F2F 14D: CPT | Performed by: NURSE PRACTITIONER

## 2020-07-10 RX ORDER — MELOXICAM 7.5 MG/1
7.5 TABLET ORAL DAILY
Qty: 30 TABLET | Refills: 2 | Status: SHIPPED | OUTPATIENT
Start: 2020-07-10 | End: 2020-12-08

## 2020-07-10 NOTE — PROGRESS NOTES
Transitional Care Follow Up Visit  Subjective     Tyrone Kraft is a 86 y.o. male who presents for a transitional care management visit.    Within 48 business hours after discharge our office contacted him via telephone to coordinate his care and needs.      I reviewed and discussed the details of that call along with the discharge summary, hospital problems, inpatient lab results, inpatient diagnostic studies, and consultation reports with Ms Hansen due to cognitive deficiencies.     Current outpatient and discharge medications have been reconciled for the patient.  Reviewed by: AJ Smith      Date of TCM Phone Call 7/8/2020   [facility]   Date of Admission 7/6/2020   Date of Discharge 7/1/2020   Discharge Disposition Home or Self Care     Risk for Readmission (LACE) Score: 11 (7/8/2020  6:00 AM)      Fall   The accident occurred 5 to 7 days ago. The fall occurred while standing. He fell from a height of 3 to 5 ft. He landed on concrete. The volume of blood lost was minimal. The point of impact was the head. The pain is present in the head and neck. Pain scale: appears comfortable, unable to verbalize. The pain is mild. The symptoms are aggravated by movement. Pertinent negatives include no loss of consciousness (per spouse, present in room ). Treatments tried: cervical collar. The treatment provided moderate relief.      Course During Hospital Stay:  This is an 86-year-old male, pleasantly confused, with a recent fall while trying to step off the porch. He fell foreward, resulting in a wound to his forehead. No loss of consciousness reported. He was found by their son who happened to come out of the garage. He did suffer a large hematoma, as well as a fractured odontoid and C2 fracture. He is currently wearing a cervical collar. He was evaluated by neurosurgery, repeat Ct was completed and surveillance continues with continuous hard cervical collar. A medication change was completed  in the past several weeks prior to his fall, his abilify was increased. Spouse reports previous episodes of falls. Hx of dementia, progressing, however no behaviors are reported.     The following portions of the patient's history were reviewed and updated as appropriate: allergies, current medications, past family history, past medical history, past social history, past surgical history and problem list.    Review of Systems   Unable to perform ROS: Dementia   Neurological: Negative for loss of consciousness (per spouse, present in room ).       Objective   Physical Exam   Constitutional: He is oriented to person, place, and time. He appears well-developed and well-nourished. No distress.   Neck: Neck supple.   Cardiovascular: Normal rate, regular rhythm and normal heart sounds. Exam reveals no gallop and no friction rub.   No murmur heard.  Pulmonary/Chest: Effort normal and breath sounds normal. No respiratory distress. He has no wheezes. He has no rales.   Abdominal: Soft. Bowel sounds are normal. He exhibits no distension. There is no tenderness.   Neurological: He is alert and oriented to person, place, and time.   Skin: Skin is warm and dry. He is not diaphoretic.   Psychiatric: He has a normal mood and affect.   Vitals reviewed.      Assessment/Plan   Tyrone was seen today for follow-up.    Diagnoses and all orders for this visit:    Hospital discharge follow-up    Fall, subsequent encounter    Closed odontoid fracture with routine healing, subsequent encounter    Closed nondisplaced fracture of first cervical vertebra with routine healing, unspecified fracture morphology, subsequent encounter    Other orders  -     meloxicam (Mobic) 7.5 MG tablet; Take 1 tablet by mouth Daily.       Start meloxicam prn pain, will need to monitor kidney function, reviewed labs, last kidney function wnl  Still bruised, dementia, at baseline  Follow up scheduled with neurosurgery, monitoring closely  Wearing cervical collar  as ordered

## 2020-07-10 NOTE — TELEPHONE ENCOUNTER
PATIENT CALLED WANTING TO BE SEEN BY DR RIVAS.  IT IS A HOSP FOLLOW UP.  HE IS CURRENTLY SCHEDULED TO SEE DR CUETO.  PLEASE REVIEW CHART AND IF POSSIBLE PLEASE RESCHEDULE WITH DR RIVAS AND INFORM PATIENT.    [phone]

## 2020-07-10 NOTE — OUTREACH NOTE
Call Center TCM Note      Responses   [facility] patient discharged from?  [place]   COVID-19 Test Status  Not tested   Does the patient have one of the following disease processes/diagnoses(primary or secondary)?  Other   TCM attempt successful?  No   Unsuccessful attempts  Attempt 3          Zoe Green RN    7/10/2020, 09:29

## 2020-07-10 NOTE — PROGRESS NOTES
Passing this on to you.  We did also call the patient in attempt to change OV to a VV , but there was no answer and we could not leave a message    normal

## 2020-07-13 NOTE — TELEPHONE ENCOUNTER
Spoke with patient's wife and he does not want to see Dr. Pennington and Dr. Juarez does not see cervical fractures or hematomas so I scheduled him with Dr. Rodriguez and moved his CT as well. Patient is aware.

## 2020-07-16 NOTE — PROGRESS NOTES
Subjective   History of Present Illness: Tyrone Kraft is a 86 y.o. male is here today for 2 wk hospital follow-up of C1 cervical fracture and frontal scalp hematoma with same day CT Head & XR C Spine. He was hospitalized at [facility] 7/6/20-7/8/20 after he fell at home hitting the left side of his head on the pavement. He had CT Head, CT C Spine and XR C Spine during admission. Today Mr. Kraft denies having any symptoms at this time. His wife reports that he has issues with balance and gait in which he uses a walker at home to assist with ambulation. He has some intermittent issues with confusion and decreased concentration.  He reports that he has been wearing his c-collar while up walking around.  He reports occasional neck pain.  The neck pain is intermittent and occurs daily.  Neck pain does not radiate.  He denies any numbness or weakness.    History of Present Illness    The following portions of the patient's history were reviewed and updated as appropriate: allergies, current medications, past family history, past medical history, past social history, past surgical history and problem list.    Past Medical History:   Diagnosis Date   • BPPV (benign paroxysmal positional vertigo) 02/03/2015    I have put in a script for meclizine to take at night.  If he is not better in a week, will need to send to ENT for Eply maneuvers.   • Chronic kidney disease     Chronic. CR previously 1.6-2 but it was 1.4 in 11/13, f/b Dr. Goff   • Chronic kidney disease 02/03/2015    Stable. he sees Dr. Goff.  I will send his labs to [place].   • Chronic kidney disease 08/13/2015    Stable.  Cont to monitor q4-6 mo.   • Coronary arteriosclerosis     MI in 1986 treated medically, had cath and then CABG in 1990 in [place], had stents in 2000 in [place].  Myoview 10/2014  MI but no ischemia.   • Dementia without behavioral disturbance (CMS/[facility]) 08/13/2015    Managed by Psychiatrist. I have encouraged his wife to be very vigilant with  his driving and his reation time.  According to them, his psychiatrist is OK with him driving.   • Enlarged prostate without lower urinary tract symptoms (luts)    • Gastroparesis    • GERD (gastroesophageal reflux disease)    • Hypercalcemia 02/03/2015    Recheck labs in 3 months.   • Hyperlipidemia 08/13/2015    Very well controlled.  cont current meds.   • Hypertension 08/13/2015    Controlled. Cont current meds.   • Ischemic cardiomyopathy     Reported history, s/p singl lead Medtronic ICD 2006 in [place], no details available; repeat echo 2/2013 showed normal LV size and function, EF 55%   • MGUS (monoclonal gammopathy of unknown significance)    • Nonsustained ventricular tachycardia (CMS/HCC)     Noted incidentally on ECD interrogation   • KELLY (obstructive sleep apnea)         Past Surgical History:   Procedure Laterality Date   • ANGIOPLASTY      Cath Stent Placement   • CARDIAC DEFIBRILLATOR PLACEMENT      Medtronic singl lead, 2006, [facility]   • CORONARY ARTERY BYPASS GRAFT     • TOE SURGERY            Current Outpatient Medications:   •  acebutolol (SECTRAL) 200 MG capsule, Take 200 mg by mouth Daily., Disp: , Rfl:   •  ARIPiprazole (ABILIFY) 2 MG tablet, Take 4 mg by mouth Daily., Disp: , Rfl:   •  cycloSPORINE (RESTASIS) 0.05 % ophthalmic emulsion, Administer 1 drop to both eyes 2 (Two) Times a Day., Disp: , Rfl:   •  esomeprazole (NexIUM) 40 MG capsule, Take 1 capsule by mouth daily., Disp: , Rfl:   •  Glucosamine 500 MG capsule, Take 1 capsule by mouth Daily., Disp: , Rfl:   •  levothyroxine (SYNTHROID, LEVOTHROID) 50 MCG tablet, Take 50 mcg by mouth Daily., Disp: , Rfl:   •  Loratadine 10 MG capsule, Take 1 tablet by mouth Daily., Disp: , Rfl:   •  melatonin 5 MG tablet tablet, Take 10 mg by mouth Every Night., Disp: , Rfl:   •  meloxicam (Mobic) 7.5 MG tablet, Take 1 tablet by mouth Daily., Disp: 30 tablet, Rfl: 2  •  memantine (NAMENDA) 10 MG tablet, Take 20 mg by mouth Daily., Disp: , Rfl:   •   Multiple Vitamins-Minerals (MULTIVITAMIN ADULTS PO), Take 1 tablet by mouth Daily., Disp: , Rfl:   •  Omega-3 Fatty Acids (FISH OIL) 1000 MG capsule capsule, Take 1,000 mg by mouth Daily With Breakfast., Disp: , Rfl:   •  polycarbophil (FIBERCON) 625 MG tablet, Take 1,350 mg by mouth 2 (two) times a day., Disp: , Rfl:   •  rivastigmine (EXELON) 6 MG capsule, Take 1 capsule by mouth 2 (two) times a day., Disp: , Rfl:   •  tamsulosin (FLOMAX) 0.4 MG capsule 24 hr capsule, Take 1 capsule by mouth Daily. (Patient taking differently: Take 1 capsule by mouth Every Night.), Disp: 90 capsule, Rfl: 1  •  traZODone (DESYREL) 50 MG tablet, Take 1 tablet by mouth nightly., Disp: , Rfl:   •  venlafaxine XR (EFFEXOR-XR) 37.5 MG 24 hr capsule, Take 37.5 mg by mouth Daily., Disp: , Rfl:      Social History     Socioeconomic History   • Marital status:      Spouse name: Not on file   • Number of children: Not on file   • Years of education: Not on file   • Highest education level: Not on file   Tobacco Use   • Smoking status: Former Smoker   • Smokeless tobacco: Never Used   • Tobacco comment: minimal years ago   Substance and Sexual Activity   • Alcohol use: No   • Drug use: No   • Sexual activity: Defer        Family History   Problem Relation Age of Onset   • Thyroid disease Mother    • Lung disease Father    • Depression Brother    • Cancer Brother         LIver   • Myelodysplastic syndrome Brother    • Stroke Brother         Review of Systems   Constitutional: Negative for chills, fatigue and fever.   Eyes: Negative for visual disturbance.   Respiratory: Negative for cough and shortness of breath.    Gastrointestinal: Negative for abdominal pain and constipation.   Genitourinary: Positive for frequency and urgency. Negative for difficulty urinating.   Musculoskeletal: Positive for gait problem.   Neurological: Negative for dizziness, weakness, light-headedness, numbness and headaches.   Psychiatric/Behavioral: Positive  "for confusion and decreased concentration.       Objective     Vitals:    07/22/20 1021   BP: 110/50   Pulse: 54   Temp: 97.6 °F (36.4 °C)   Weight: 74.8 kg (165 lb)   Height: 175.3 cm (69\")     Body mass index is 24.37 kg/m².      Physical Exam   Eyes: Pupils are equal, round, and reactive to light. EOM are normal.   Neurological: He has an abnormal Finger-Nose-Finger Test.   Reflex Scores:       Bicep reflexes are 1+ on the right side and 1+ on the left side.       Patellar reflexes are 1+ on the right side and 1+ on the left side.    Neurologic Exam     Mental Status   Oriented to person.   Oriented to place.   Disoriented to year.   Attention: decreased. Concentration: decreased.   Level of consciousness: alert    Cranial Nerves     CN II   Visual acuity: normal with correction  Right visual field deficit: none  Left visual field deficit: none     CN III, IV, VI   Pupils are equal, round, and reactive to light.  Extraocular motions are normal.   Right pupil: Size: 2 mm. Shape: regular. Reactivity: brisk. Consensual response: intact.   Left pupil: Size: 2 mm. Shape: regular. Reactivity: brisk. Consensual response: intact.   Nystagmus: none   Diplopia: none  Ophthalmoparesis: none    CN V   Right facial sensation deficit: complete  Left facial sensation deficit: complete    CN VII   Right facial weakness: none  Left facial weakness: none    CN VIII   Hearing: intact    Motor Exam   Overall muscle tone: normal  Right arm tone: normal  Left arm tone: normal  Right arm pronator drift: absent  Left arm pronator drift: absent  Right leg tone: normal  Left leg tone: normal    Strength   Right neck flexion: 5/5  Left neck flexion: 5/5  Right neck extension: 5/5  Left neck extension: 5/5  Right deltoid: 5/5  Left deltoid: 5/5  Right biceps: 5/5  Left biceps: 5/5  Right triceps: 5/5  Left triceps: 5/5  Right wrist flexion: 5/5  Left wrist flexion: 5/5  Right wrist extension: 5/5  Left wrist extension: 5/5  Right " interossei: 5/5  Left interossei: 5/5  Right abdominals: 5/5  Left abdominals: 5/5  Right iliopsoas: 5/5  Left iliopsoas: 5/5  Right quadriceps: 5/5  Left quadriceps: 5/5  Right hamstrin/5  Left hamstrin/5  Right glutei: 5/5  Left glutei: 5/5  Right anterior tibial: 5/5  Left anterior tibial: 5/5  Right posterior tibial: 5/5  Left posterior tibial: 5/5  Right peroneal: 5/5  Left peroneal: 5/5  Right gastroc: 5/5  Left gastroc: 5/5    Sensory Exam   Light touch normal.     Gait, Coordination, and Reflexes     Coordination   Finger to nose coordination: abnormal    Tremor   Resting tremor: present    Reflexes   Right biceps: 1+  Left biceps: 1+  Right patellar: 1+  Left patellar: 1+  Right Johnson: absent  Left Johnson: absent  Right ankle clonus: absent  Left ankle clonus: absent      Independent Review of Radiographic Studies:      I personally reviewed the images from the following studies.  I personally reviewed his CT head and x-ray C-spine from today.  I have also reviewed his most recent CT cervical spine, CT head, and cervical x-rays.  The CT head from today shows no evidence of subdural hematoma.  His scalp hematoma is significantly reduced in size.  He has significant atrophy for his age.  His CT and x-rays of the cervical spine demonstrate a C1 posterior ring fracture on the right which is nondisplaced as well as a nondisplaced type II odontoid fracture.  The fractures appear to be nondisplaced with no evidence of canal stenosis.    Assessment/Plan   Patient is an 86-year-old male status post fall on front porch on   -He was seen in the hospital where he had a CT scan of his head and cervical spine which demonstrated a C1 and C2 fracture  -He reports that his pain has been improving denies any new numbness or weakness  -His wife is with him and helps with some of the history.  He has a several year history of dementia.  He reports that he has become more unsteady while walking and is now  using a walker at home.  -I reviewed the images and showed them to both the patient and his wife.  I explained that the fractures are nondisplaced and would likely heal with more time.  I recommended that he avoid NSAIDs such as ibuprofen, Aleve, Motrin.  I recommend that he avoid heavy lifting more than 5 pounds per arm.  Suggested that he keep the c-collar in place anytime while up and active.  The more he could wear the c-collar better chances of healing.  -We will plan to continue home physical therapy  -We will plan for follow-up in clinic in 4 weeks with repeat x-rays of cervical spine  -We will reevaluate x-rays and can consider removing c-collar at that time  -They were told to call us if any new numbness weakness or increasing pain or increasing instability or falls  -recommend continue ASA 81mg daily for CAD    Medical Decision Making:        Tyrone was seen today for c1 cervical fracture.    Diagnoses and all orders for this visit:    Closed odontoid fracture, sequela    Traumatic injury of head, subsequent encounter    Closed displaced fracture of first cervical vertebra, unspecified fracture morphology, initial encounter (CMS/[facility])      Return in about 4 weeks (around 8/19/2020).

## 2020-07-17 ENCOUNTER — READMISSION MANAGEMENT (OUTPATIENT)
Dept: CALL CENTER | Facility: HOSPITAL | Age: 85
End: 2020-07-17

## 2020-07-17 NOTE — OUTREACH NOTE
Medical Week 2 Survey      Responses   [facility] patient discharged from?  [place]   COVID-19 Test Status  Not tested   Does the patient have one of the following disease processes/diagnoses(primary or secondary)?  Other   Week 2 attempt successful?  Yes   Call start time  0916   Discharge diagnosis  Cervial fracture   Rescheduled  Revoked [Home Health is coming. Aware of Nurse Call Center.]   Person spoke with today (if not patient) and relationship  Maria, spouse          Daniela Sainz RN

## 2020-07-22 ENCOUNTER — OFFICE VISIT (OUTPATIENT)
Dept: NEUROSURGERY | Facility: CLINIC | Age: 85
End: 2020-07-22

## 2020-07-22 ENCOUNTER — HOSPITAL ENCOUNTER (OUTPATIENT)
Dept: GENERAL RADIOLOGY | Facility: HOSPITAL | Age: 85
Discharge: HOME | End: 2020-07-22

## 2020-07-22 ENCOUNTER — HOSPITAL ENCOUNTER (OUTPATIENT)
Dept: CT IMAGING | Facility: HOSPITAL | Age: 85
Discharge: HOME | End: 2020-07-22
Admitting: NURSE PRACTITIONER

## 2020-07-22 VITALS
WEIGHT: 165 LBS | HEIGHT: 69 IN | HEART RATE: 54 BPM | DIASTOLIC BLOOD PRESSURE: 50 MMHG | SYSTOLIC BLOOD PRESSURE: 110 MMHG | TEMPERATURE: 97.6 F | BODY MASS INDEX: 24.44 KG/M2

## 2020-07-22 DIAGNOSIS — S12.100S CLOSED ODONTOID FRACTURE, SEQUELA: ICD-10-CM

## 2020-07-22 DIAGNOSIS — S09.90XD TRAUMATIC INJURY OF HEAD, SUBSEQUENT ENCOUNTER: ICD-10-CM

## 2020-07-22 DIAGNOSIS — S12.000A CLOSED DISPLACED FRACTURE OF FIRST CERVICAL VERTEBRA, UNSPECIFIED FRACTURE MORPHOLOGY, INITIAL ENCOUNTER (HCC): ICD-10-CM

## 2020-07-22 DIAGNOSIS — I60.9 SAH (SUBARACHNOID HEMORRHAGE) (HCC): ICD-10-CM

## 2020-07-22 DIAGNOSIS — S12.100S CLOSED ODONTOID FRACTURE, SEQUELA: Primary | ICD-10-CM

## 2020-07-22 PROCEDURE — 72040 X-RAY EXAM NECK SPINE 2-3 VW: CPT

## 2020-07-22 PROCEDURE — 70450 CT HEAD/BRAIN W/O DYE: CPT

## 2020-07-22 PROCEDURE — 99214 OFFICE O/P EST MOD 30 MIN: CPT | Performed by: NEUROLOGICAL SURGERY

## 2020-08-03 ENCOUNTER — TELEPHONE (OUTPATIENT)
Dept: NEUROSURGERY | Facility: CLINIC | Age: 85
End: 2020-08-03

## 2020-08-03 NOTE — TELEPHONE ENCOUNTER
Please see previous message sent from patient in chart regarding a fall this past weekend. He fell forward because his knees were bruised. His neck hurts from the fall. Should he have a new xray or wait till next appt? Pt number is [phone].

## 2020-08-14 ENCOUNTER — OFFICE VISIT (OUTPATIENT)
Dept: FAMILY MEDICINE CLINIC | Facility: CLINIC | Age: 85
End: 2020-08-14

## 2020-08-14 VITALS
SYSTOLIC BLOOD PRESSURE: 126 MMHG | TEMPERATURE: 97.5 F | BODY MASS INDEX: 25.2 KG/M2 | DIASTOLIC BLOOD PRESSURE: 62 MMHG | RESPIRATION RATE: 16 BRPM | HEART RATE: 69 BPM | WEIGHT: 170.1 LBS | HEIGHT: 69 IN | OXYGEN SATURATION: 98 %

## 2020-08-14 DIAGNOSIS — R60.0 LOWER EXTREMITY EDEMA: ICD-10-CM

## 2020-08-14 DIAGNOSIS — E03.9 ACQUIRED HYPOTHYROIDISM: Primary | ICD-10-CM

## 2020-08-14 DIAGNOSIS — Z79.899 MEDICATION MANAGEMENT: ICD-10-CM

## 2020-08-14 PROCEDURE — 99213 OFFICE O/P EST LOW 20 MIN: CPT | Performed by: NURSE PRACTITIONER

## 2020-08-14 RX ORDER — HYDROCHLOROTHIAZIDE 12.5 MG/1
12.5 TABLET ORAL DAILY
Qty: 90 TABLET | Refills: 1 | Status: SHIPPED | OUTPATIENT
Start: 2020-08-14 | End: 2021-02-11

## 2020-08-19 ENCOUNTER — HOSPITAL ENCOUNTER (OUTPATIENT)
Dept: GENERAL RADIOLOGY | Facility: HOSPITAL | Age: 85
Discharge: HOME | End: 2020-08-19
Admitting: NEUROLOGICAL SURGERY

## 2020-08-19 DIAGNOSIS — S12.100S CLOSED ODONTOID FRACTURE, SEQUELA: ICD-10-CM

## 2020-08-19 DIAGNOSIS — S12.000A CLOSED DISPLACED FRACTURE OF FIRST CERVICAL VERTEBRA, UNSPECIFIED FRACTURE MORPHOLOGY, INITIAL ENCOUNTER (HCC): ICD-10-CM

## 2020-08-19 PROCEDURE — 72050 X-RAY EXAM NECK SPINE 4/5VWS: CPT

## 2020-08-19 NOTE — PROGRESS NOTES
"Adam Kraft is a 86 y.o. male   who presents for   Chief Complaint   Patient presents with   • Leg Swelling     left leg few weeks getting worse at night   • Joint Swelling           /62   Pulse 69   Temp 97.5 °F (36.4 °C)   Resp 16   Ht 175.3 cm (69\")   Wt 77.2 kg (170 lb 1.6 oz)   SpO2 98%   BMI 25.12 kg/m²       History of Present Illness   Leg Swelling   This is a new problem. The current episode started more than 1 month ago. The problem occurs intermittently. The problem has been waxing and waning. Associated symptoms include arthralgias and joint swelling. Pertinent negatives include no abdominal pain, anorexia, change in bowel habit, chest pain, chills, congestion, coughing, diaphoresis, fatigue, fever, headaches, myalgias, nausea, neck pain, numbness, rash, sore throat, swollen glands, urinary symptoms, vertigo, visual change, vomiting or weakness. Nothing aggravates the symptoms. He has tried nothing for the symptoms.   Joint Swelling   This is a new problem. The current episode started 1 to 4 weeks ago. Associated symptoms include arthralgias and joint swelling. Pertinent negatives include no abdominal pain, anorexia, change in bowel habit, chest pain, chills, congestion, coughing, diaphoresis, fatigue, fever, headaches, myalgias, nausea, neck pain, numbness, rash, sore throat, swollen glands, urinary symptoms, vertigo, visual change, vomiting or weakness.       The following portions of the patient's history were reviewed and updated as appropriate: allergies, current medications, past family history, past medical history, past social history, past surgical history and problem list.    Review of Systems  Review of Systems   Constitutional: Negative for chills, diaphoresis, fatigue and fever.   HENT: Negative for congestion and sore throat.    Respiratory: Negative for cough.    Cardiovascular: Negative for chest pain.   Gastrointestinal: Negative for abdominal pain, " anorexia, change in bowel habit, nausea and vomiting.   Musculoskeletal: Positive for arthralgias and joint swelling. Negative for myalgias and neck pain.   Skin: Negative for rash.   Neurological: Negative for vertigo, weakness, numbness and headaches.       Objective   Physical Exam  Physical Exam   Constitutional: He appears well-developed and well-nourished. No distress.   Neck: Neck supple.   Cardiovascular: Normal rate, regular rhythm and normal heart sounds. Exam reveals no gallop and no friction rub.   No murmur heard.  Pulmonary/Chest: Effort normal and breath sounds normal. No respiratory distress. He has no wheezes. He has no rales.   Abdominal: Soft. Bowel sounds are normal. He exhibits no distension. There is no tenderness.   Neurological: He is alert.   Skin: Skin is warm and dry. He is not diaphoretic.   Psychiatric: He has a normal mood and affect.   Vitals reviewed.        Assessment/Plan   Tyrone was seen today for leg swelling and joint swelling.    Diagnoses and all orders for this visit:    Acquired hypothyroidism  -     Comprehensive metabolic panel; Future  -     TSH Rfx On Abnormal To Free T4; Future    Medication management  -     Comprehensive metabolic panel; Future  -     TSH Rfx On Abnormal To Free T4; Future    Other orders  -     hydroCHLOROthiazide (HYDRODIURIL) 12.5 MG tablet; Take 1 tablet by mouth Daily.    Answers for HPI/ROS submitted by the patient on 8/13/2020   What is the primary reason for your visit?: Other  Please describe your symptoms.: Pitting edema bilateral lower extremities, left side worse than right, progressively worsening over several weeks. No previous history of  edema.  Have you had these symptoms before?: No  How long have you been having these symptoms?: Greater than 2 weeks  Please list any medications you are currently taking for this condition.: N/A  Please describe any probable cause for these symptoms. : N/A  will add water pill daily as needed to help  improve fluid  Recommend compression socks to help improve circulation  patient with recent falls, noncompliant with neck brace. Activity is limited  Will check thyroid and CMP in future

## 2020-09-14 ENCOUNTER — RESULTS ENCOUNTER (OUTPATIENT)
Dept: FAMILY MEDICINE CLINIC | Facility: CLINIC | Age: 85
End: 2020-09-14

## 2020-09-14 DIAGNOSIS — Z79.899 MEDICATION MANAGEMENT: ICD-10-CM

## 2020-09-14 DIAGNOSIS — E03.9 ACQUIRED HYPOTHYROIDISM: ICD-10-CM

## 2020-10-21 ENCOUNTER — FLU SHOT (OUTPATIENT)
Dept: FAMILY MEDICINE CLINIC | Facility: CLINIC | Age: 85
End: 2020-10-21

## 2020-10-21 DIAGNOSIS — Z23 NEED FOR VACCINATION: ICD-10-CM

## 2020-10-21 PROCEDURE — 90694 VACC AIIV4 NO PRSRV 0.5ML IM: CPT | Performed by: NURSE PRACTITIONER

## 2020-10-21 PROCEDURE — G0008 ADMIN INFLUENZA VIRUS VAC: HCPCS | Performed by: NURSE PRACTITIONER

## 2020-11-18 ENCOUNTER — TELEPHONE (OUTPATIENT)
Dept: FAMILY MEDICINE CLINIC | Facility: CLINIC | Age: 85
End: 2020-11-18

## 2020-11-18 NOTE — TELEPHONE ENCOUNTER
Pt's wife called to make an appt, Pt complaining of cough (stated he's had it for years) and started with some wheezing today.    Please advise    [phone]

## 2020-12-04 LAB
ALBUMIN SERPL-MCNC: 3.6 G/DL (ref 3.6–4.6)
ALBUMIN/GLOB SERPL: 1 {RATIO} (ref 1.2–2.2)
ALP SERPL-CCNC: 162 IU/L (ref 39–117)
ALT SERPL-CCNC: 26 IU/L (ref 0–44)
AST SERPL-CCNC: 30 IU/L (ref 0–40)
BILIRUB SERPL-MCNC: 0.8 MG/DL (ref 0–1.2)
BUN SERPL-MCNC: 16 MG/DL (ref 8–27)
BUN/CREAT SERPL: 12 (ref 10–24)
CALCIUM SERPL-MCNC: 9.8 MG/DL (ref 8.6–10.2)
CHLORIDE SERPL-SCNC: 96 MMOL/L (ref 96–106)
CO2 SERPL-SCNC: 25 MMOL/L (ref 20–29)
CREAT SERPL-MCNC: 1.35 MG/DL (ref 0.76–1.27)
GLOBULIN SER CALC-MCNC: 3.5 G/DL (ref 1.5–4.5)
GLUCOSE SERPL-MCNC: 97 MG/DL (ref 65–99)
POTASSIUM SERPL-SCNC: 4.4 MMOL/L (ref 3.5–5.2)
PROT SERPL-MCNC: 7.1 G/DL (ref 6–8.5)
SODIUM SERPL-SCNC: 135 MMOL/L (ref 134–144)
T4 FREE SERPL-MCNC: 1.21 NG/DL (ref 0.82–1.77)
TSH SERPL DL<=0.005 MIU/L-ACNC: 10 UIU/ML (ref 0.45–4.5)

## 2020-12-08 ENCOUNTER — OFFICE VISIT (OUTPATIENT)
Dept: FAMILY MEDICINE CLINIC | Facility: CLINIC | Age: 85
End: 2020-12-08

## 2020-12-08 VITALS
BODY MASS INDEX: 25.96 KG/M2 | DIASTOLIC BLOOD PRESSURE: 80 MMHG | SYSTOLIC BLOOD PRESSURE: 152 MMHG | HEIGHT: 69 IN | OXYGEN SATURATION: 95 % | HEART RATE: 62 BPM | WEIGHT: 175.3 LBS | TEMPERATURE: 97.5 F | RESPIRATION RATE: 16 BRPM

## 2020-12-08 DIAGNOSIS — E03.9 ACQUIRED HYPOTHYROIDISM: Primary | ICD-10-CM

## 2020-12-08 DIAGNOSIS — R63.5 WEIGHT GAIN: ICD-10-CM

## 2020-12-08 DIAGNOSIS — R53.83 FATIGUE, UNSPECIFIED TYPE: ICD-10-CM

## 2020-12-08 PROCEDURE — 99213 OFFICE O/P EST LOW 20 MIN: CPT | Performed by: NURSE PRACTITIONER

## 2020-12-08 RX ORDER — TRAZODONE HYDROCHLORIDE 50 MG/1
25 TABLET ORAL DAILY
COMMUNITY

## 2020-12-08 RX ORDER — LEVOTHYROXINE SODIUM 0.07 MG/1
75 TABLET ORAL DAILY
Qty: 90 TABLET | Refills: 0 | Status: SHIPPED | OUTPATIENT
Start: 2020-12-08 | End: 2021-02-18

## 2020-12-08 NOTE — PROGRESS NOTES
"Subjective   Tyrone Kraft is a 86 y.o. male   who presents for   Chief Complaint   Patient presents with   • Cough   • Alzheimer's Disease     Gained weight, eats sweets, increased trazodone dose  Will go from eating a good amount, then not wanting to eat anything the next day  Not drinking a lot of fluids, no odor to urine    Wife is concerned as he seems different. One day he did not recognize her. She states she expects this to worsen, however it was a quick onset. Labs reviewed at time of visit with patient/spouse.    Hypothyroid, taking medication, levels are increased. Does have more fatigue and weight gain.     /80   Pulse 62   Temp 97.5 °F (36.4 °C)   Resp 16   Ht 175.3 cm (69\")   Wt 79.5 kg (175 lb 4.8 oz)   SpO2 95%   BMI 25.89 kg/m²       History of Present Illness   Hypothyroidism  This is a chronic problem. The current episode started more than 1 year ago. The problem occurs daily. The problem has been gradually worsening. Associated symptoms include fatigue. Pertinent negatives include no abdominal pain, anorexia, arthralgias, change in bowel habit, chest pain, chills, congestion, coughing, diaphoresis, fever, headaches, joint swelling, myalgias, nausea, neck pain, numbness, rash, sore throat, swollen glands, urinary symptoms, vertigo, visual change, vomiting or weakness. Nothing aggravates the symptoms. Treatments tried: levothhyroxine. The treatment provided moderate relief.   Fatigue  This is a new problem. The current episode started 1 to 4 weeks ago. The problem occurs daily. The problem has been waxing and waning. Associated symptoms include fatigue. Pertinent negatives include no abdominal pain, anorexia, arthralgias, change in bowel habit, chest pain, chills, congestion, coughing, diaphoresis, fever, headaches, joint swelling, myalgias, nausea, neck pain, numbness, rash, sore throat, swollen glands, urinary symptoms, vertigo, visual change, vomiting or weakness. Nothing " aggravates the symptoms. He has tried nothing for the symptoms. The treatment provided no relief.       The following portions of the patient's history were reviewed and updated as appropriate: allergies, current medications, past family history, past medical history, past social history, past surgical history and problem list.    Review of Systems  Review of Systems   Constitutional: Positive for activity change (decreased), appetite change (at times eats more, and then will not eat at all), fatigue and unexpected weight change (weight gain 10 lbs). Negative for chills, diaphoresis and fever.   HENT: Negative.  Negative for congestion and sore throat.    Eyes: Negative.    Respiratory: Negative.  Negative for cough.    Cardiovascular: Negative.  Negative for chest pain.   Gastrointestinal: Negative.  Negative for abdominal pain, anorexia, change in bowel habit, nausea and vomiting.   Endocrine: Negative.    Genitourinary: Negative.    Musculoskeletal: Negative.  Negative for arthralgias, joint swelling, myalgias and neck pain.   Skin: Negative.  Negative for rash.   Allergic/Immunologic: Negative.    Neurological: Negative.  Negative for vertigo, weakness, numbness and headaches.   Hematological: Negative.    Psychiatric/Behavioral: Negative.        Objective   Physical Exam  Physical Exam  Constitutional:       General: He is not in acute distress.     Appearance: He is well-developed. He is not diaphoretic.      Comments: Appears drowsy     HENT:      Mouth/Throat:      Mouth: Mucous membranes are moist.   Neck:      Musculoskeletal: Neck supple.   Cardiovascular:      Rate and Rhythm: Normal rate and regular rhythm.      Heart sounds: Normal heart sounds. No murmur. No friction rub. No gallop.    Pulmonary:      Effort: Pulmonary effort is normal. No respiratory distress.      Breath sounds: Normal breath sounds. No wheezing or rales.   Abdominal:      General: Bowel sounds are normal. There is no distension.       Palpations: Abdomen is soft.      Tenderness: There is no abdominal tenderness.   Skin:     General: Skin is warm and dry.   Neurological:      Mental Status: He is alert, oriented to person, place, and time and easily aroused.   Psychiatric:         Behavior: Behavior is cooperative.           Assessment/Plan   Diagnoses and all orders for this visit:    1. Acquired hypothyroidism (Primary)  -     TSH Rfx On Abnormal To Free T4; Future  -     Comprehensive metabolic panel; Future    2. Fatigue, unspecified type    3. Weight gain    Other orders  -     levothyroxine (Synthroid) 75 MCG tablet; Take 1 tablet by mouth Daily.  Dispense: 90 tablet; Refill: 0    will check a urine, patient unable to void in office, sterile cup given to spouse to return at her convenience  Do suspect thyroid is contributing to symptoms, he is currently uncontrolled TSH 10, which can contribute to fatigue and weight gain  Will increase thyroid medication and repeat in 8 weeks  Spouse is in agreement with both recommendations,  Psychiatry did start trazodone in am as well, low dose, this may be contributing to fatigue, will continue to monitor and recommend follow up with specialist as scheduled, sooner if symptoms do not improve

## 2020-12-15 DIAGNOSIS — R53.83 FATIGUE, UNSPECIFIED TYPE: Primary | ICD-10-CM

## 2020-12-15 DIAGNOSIS — R40.4 TRANSIENT ALTERATION OF AWARENESS: ICD-10-CM

## 2020-12-15 LAB
BILIRUB BLD-MCNC: NEGATIVE MG/DL
CLARITY, POC: CLEAR
COLOR UR: YELLOW
GLUCOSE UR STRIP-MCNC: NEGATIVE MG/DL
KETONES UR QL: NEGATIVE
LEUKOCYTE EST, POC: NEGATIVE
NITRITE UR-MCNC: NEGATIVE MG/ML
PH UR: 6 [PH] (ref 5–8)
PROT UR STRIP-MCNC: NEGATIVE MG/DL
RBC # UR STRIP: NEGATIVE /UL
SP GR UR: 1.01 (ref 1–1.03)
UROBILINOGEN UR QL: NORMAL

## 2020-12-15 PROCEDURE — 81003 URINALYSIS AUTO W/O SCOPE: CPT | Performed by: NURSE PRACTITIONER

## 2021-01-19 RX ORDER — ACEBUTOLOL HYDROCHLORIDE 200 MG/1
CAPSULE ORAL
Qty: 90 CAPSULE | Refills: 3 | Status: SHIPPED | OUTPATIENT
Start: 2021-01-19

## 2021-01-19 RX ORDER — TAMSULOSIN HYDROCHLORIDE 0.4 MG/1
1 CAPSULE ORAL NIGHTLY
Qty: 90 CAPSULE | Refills: 1 | Status: SHIPPED | OUTPATIENT
Start: 2021-01-19 | End: 2021-07-15

## 2021-02-11 RX ORDER — HYDROCHLOROTHIAZIDE 12.5 MG/1
TABLET ORAL
Qty: 90 TABLET | Refills: 1 | Status: SHIPPED | OUTPATIENT
Start: 2021-02-11 | End: 2021-08-11

## 2021-02-18 RX ORDER — LEVOTHYROXINE SODIUM 0.07 MG/1
TABLET ORAL
Qty: 90 TABLET | Refills: 3 | Status: SHIPPED | OUTPATIENT
Start: 2021-02-18

## 2021-07-15 RX ORDER — TAMSULOSIN HYDROCHLORIDE 0.4 MG/1
CAPSULE ORAL
Qty: 90 CAPSULE | Refills: 3 | Status: SHIPPED | OUTPATIENT
Start: 2021-07-15

## 2021-07-22 ENCOUNTER — TELEPHONE (OUTPATIENT)
Dept: FAMILY MEDICINE CLINIC | Facility: CLINIC | Age: 86
End: 2021-07-22

## 2021-07-22 ENCOUNTER — TELEMEDICINE (OUTPATIENT)
Dept: FAMILY MEDICINE CLINIC | Facility: CLINIC | Age: 86
End: 2021-07-22

## 2021-07-22 VITALS — BODY MASS INDEX: 25.92 KG/M2 | RESPIRATION RATE: 16 BRPM | HEIGHT: 69 IN | WEIGHT: 175 LBS

## 2021-07-22 DIAGNOSIS — E03.9 ACQUIRED HYPOTHYROIDISM: Primary | ICD-10-CM

## 2021-07-22 PROCEDURE — 99441 PR PHYS/QHP TELEPHONE EVALUATION 5-10 MIN: CPT | Performed by: NURSE PRACTITIONER

## 2021-07-22 NOTE — PROGRESS NOTES
"Chief Complaint  Hypothyroidism (6mo FU)    Subjective          Tyrone Kraft presents to [facility]  Will presents for a follow up. Sleep is increased. Denies depression.   Dr. Schoenbachler, psychiatric neurologist, managing alzheimer's medication, increased trazodone 100 mg to help facilitate sleep  Abilify went from 2-4 mg, helps with tic, cough has improved 75%  Weight is suspected to be increased, enjoys sweets, enjoys yogurt popsicles  Declines eye exams, unable to cooperate, declines dental exams at this time  He also enjoys night time snacks    Information obtained from spouse.     Review of Systems   Unable to perform ROS: Dementia       Objective   Vital Signs:   Resp 16   Ht 175.3 cm (69\")   Wt 79.4 kg (175 lb)   BMI 25.84 kg/m²     Physical Exam  Neurological:      Mental Status: He is alert.        Result Review :                 Assessment and Plan    There are no diagnoses linked to this encounter.    Follow Up   No follow-ups on file.  Patient was given instructions and counseling regarding his condition or for health maintenance advice. Please see specific information pulled into the AVS if appropriate.     Patient is doing well, no increased weight or loss, tolerating diet, will hold on TSH as he has missed several doses, will continue current medications  Follow up in six months, will obtain labs at that time.  "

## 2021-07-22 NOTE — TELEPHONE ENCOUNTER
PLEASE CALL PATIENT [phone] THEY ARE HAVING TROUBLE WITH [employer] APPT. CALLED AND ASKED FOR STONEY.

## 2021-08-11 RX ORDER — HYDROCHLOROTHIAZIDE 12.5 MG/1
TABLET ORAL
Qty: 90 TABLET | Refills: 3 | Status: SHIPPED | OUTPATIENT
Start: 2021-08-11

## 2021-08-19 DIAGNOSIS — F03.90 DEMENTIA WITHOUT BEHAVIORAL DISTURBANCE, UNSPECIFIED DEMENTIA TYPE: Primary | ICD-10-CM
